# Patient Record
Sex: FEMALE | Race: WHITE | Employment: OTHER | URBAN - METROPOLITAN AREA
[De-identification: names, ages, dates, MRNs, and addresses within clinical notes are randomized per-mention and may not be internally consistent; named-entity substitution may affect disease eponyms.]

---

## 2022-06-13 ENCOUNTER — HOSPITAL ENCOUNTER (OUTPATIENT)
Dept: CT IMAGING | Age: 77
Discharge: HOME | End: 2022-06-13
Payer: MEDICARE

## 2022-06-13 DIAGNOSIS — R29.6 REPEATED FALLS: ICD-10-CM

## 2022-06-13 DIAGNOSIS — R26.9 GAIT DIFFICULTY: ICD-10-CM

## 2022-06-13 PROCEDURE — 70450 CT HEAD/BRAIN W/O DYE: CPT

## 2022-08-03 ENCOUNTER — APPOINTMENT (OUTPATIENT)
Dept: CT IMAGING | Age: 77
End: 2022-08-03
Payer: MEDICARE

## 2022-08-03 ENCOUNTER — HOSPITAL ENCOUNTER (OUTPATIENT)
Age: 77
Setting detail: OBSERVATION
Discharge: NURSING, REHAB OR LONG-TERM CARE | End: 2022-08-05
Attending: STUDENT IN AN ORGANIZED HEALTH CARE EDUCATION/TRAINING PROGRAM | Admitting: INTERNAL MEDICINE
Payer: MEDICARE

## 2022-08-03 DIAGNOSIS — R47.01 APHASIA: Primary | ICD-10-CM

## 2022-08-03 PROBLEM — R47.81 SLURRED SPEECH: Status: ACTIVE | Noted: 2022-08-03

## 2022-08-03 LAB
ANION GAP SERPL CALCULATED.3IONS-SCNC: 10 MMOL/L (ref 3–16)
BASOPHILS ABSOLUTE: 0 K/UL (ref 0–0.2)
BASOPHILS RELATIVE PERCENT: 0.8 %
BUN BLDV-MCNC: 26 MG/DL (ref 7–20)
CALCIUM SERPL-MCNC: 9.2 MG/DL (ref 8.3–10.6)
CHLORIDE BLD-SCNC: 100 MMOL/L (ref 99–110)
CO2: 24 MMOL/L (ref 21–32)
CREAT SERPL-MCNC: 1.1 MG/DL (ref 0.6–1.2)
EOSINOPHILS ABSOLUTE: 0.2 K/UL (ref 0–0.6)
EOSINOPHILS RELATIVE PERCENT: 2.8 %
GFR AFRICAN AMERICAN: 58
GFR NON-AFRICAN AMERICAN: 48
GLUCOSE BLD-MCNC: 96 MG/DL (ref 70–99)
GLUCOSE BLD-MCNC: 98 MG/DL (ref 70–99)
HCT VFR BLD CALC: 40.8 % (ref 36–48)
HEMOGLOBIN: 14.2 G/DL (ref 12–16)
INR BLD: 1.1 (ref 0.87–1.14)
LYMPHOCYTES ABSOLUTE: 0.7 K/UL (ref 1–5.1)
LYMPHOCYTES RELATIVE PERCENT: 12.4 %
MCH RBC QN AUTO: 31.4 PG (ref 26–34)
MCHC RBC AUTO-ENTMCNC: 34.8 G/DL (ref 31–36)
MCV RBC AUTO: 90.3 FL (ref 80–100)
MONOCYTES ABSOLUTE: 0.5 K/UL (ref 0–1.3)
MONOCYTES RELATIVE PERCENT: 8.8 %
NEUTROPHILS ABSOLUTE: 4.5 K/UL (ref 1.7–7.7)
NEUTROPHILS RELATIVE PERCENT: 75.2 %
PDW BLD-RTO: 14 % (ref 12.4–15.4)
PERFORMED ON: NORMAL
PLATELET # BLD: 180 K/UL (ref 135–450)
PMV BLD AUTO: 7.5 FL (ref 5–10.5)
POTASSIUM SERPL-SCNC: 4.8 MMOL/L (ref 3.5–5.1)
PROTHROMBIN TIME: 14.1 SEC (ref 11.7–14.5)
RBC # BLD: 4.51 M/UL (ref 4–5.2)
SODIUM BLD-SCNC: 134 MMOL/L (ref 136–145)
TROPONIN: <0.01 NG/ML
WBC # BLD: 6 K/UL (ref 4–11)

## 2022-08-03 PROCEDURE — 96372 THER/PROPH/DIAG INJ SC/IM: CPT

## 2022-08-03 PROCEDURE — 93005 ELECTROCARDIOGRAM TRACING: CPT | Performed by: STUDENT IN AN ORGANIZED HEALTH CARE EDUCATION/TRAINING PROGRAM

## 2022-08-03 PROCEDURE — 80048 BASIC METABOLIC PNL TOTAL CA: CPT

## 2022-08-03 PROCEDURE — 6370000000 HC RX 637 (ALT 250 FOR IP): Performed by: INTERNAL MEDICINE

## 2022-08-03 PROCEDURE — 70496 CT ANGIOGRAPHY HEAD: CPT

## 2022-08-03 PROCEDURE — G0378 HOSPITAL OBSERVATION PER HR: HCPCS

## 2022-08-03 PROCEDURE — 96374 THER/PROPH/DIAG INJ IV PUSH: CPT

## 2022-08-03 PROCEDURE — 70450 CT HEAD/BRAIN W/O DYE: CPT

## 2022-08-03 PROCEDURE — 85610 PROTHROMBIN TIME: CPT

## 2022-08-03 PROCEDURE — 99285 EMERGENCY DEPT VISIT HI MDM: CPT

## 2022-08-03 PROCEDURE — 85025 COMPLETE CBC W/AUTO DIFF WBC: CPT

## 2022-08-03 PROCEDURE — 6360000002 HC RX W HCPCS: Performed by: INTERNAL MEDICINE

## 2022-08-03 PROCEDURE — 2580000003 HC RX 258: Performed by: INTERNAL MEDICINE

## 2022-08-03 PROCEDURE — 84484 ASSAY OF TROPONIN QUANT: CPT

## 2022-08-03 PROCEDURE — 6360000004 HC RX CONTRAST MEDICATION: Performed by: STUDENT IN AN ORGANIZED HEALTH CARE EDUCATION/TRAINING PROGRAM

## 2022-08-03 RX ORDER — ESCITALOPRAM OXALATE 10 MG/1
10 TABLET ORAL DAILY
COMMUNITY

## 2022-08-03 RX ORDER — ACETAMINOPHEN 325 MG/1
650 TABLET ORAL EVERY 4 HOURS PRN
COMMUNITY

## 2022-08-03 RX ORDER — LEVOTHYROXINE SODIUM 112 UG/1
112 TABLET ORAL DAILY
COMMUNITY

## 2022-08-03 RX ORDER — LEVOTHYROXINE SODIUM 112 UG/1
112 TABLET ORAL DAILY
Status: DISCONTINUED | OUTPATIENT
Start: 2022-08-04 | End: 2022-08-05 | Stop reason: HOSPADM

## 2022-08-03 RX ORDER — ASPIRIN 81 MG/1
81 TABLET ORAL DAILY
Status: DISCONTINUED | OUTPATIENT
Start: 2022-08-03 | End: 2022-08-05 | Stop reason: HOSPADM

## 2022-08-03 RX ORDER — OMEPRAZOLE 20 MG/1
20 CAPSULE, DELAYED RELEASE ORAL DAILY
COMMUNITY

## 2022-08-03 RX ORDER — POLYETHYLENE GLYCOL 3350 17 G/17G
17 POWDER, FOR SOLUTION ORAL DAILY
COMMUNITY

## 2022-08-03 RX ORDER — CARVEDILOL 12.5 MG/1
12.5 TABLET ORAL 2 TIMES DAILY WITH MEALS
Status: DISCONTINUED | OUTPATIENT
Start: 2022-08-03 | End: 2022-08-05 | Stop reason: HOSPADM

## 2022-08-03 RX ORDER — SODIUM CHLORIDE 0.9 % (FLUSH) 0.9 %
10 SYRINGE (ML) INJECTION EVERY 12 HOURS SCHEDULED
Status: DISCONTINUED | OUTPATIENT
Start: 2022-08-03 | End: 2022-08-05 | Stop reason: HOSPADM

## 2022-08-03 RX ORDER — SODIUM CHLORIDE 9 MG/ML
INJECTION, SOLUTION INTRAVENOUS PRN
Status: DISCONTINUED | OUTPATIENT
Start: 2022-08-03 | End: 2022-08-05 | Stop reason: HOSPADM

## 2022-08-03 RX ORDER — ASPIRIN 300 MG/1
300 SUPPOSITORY RECTAL DAILY
Status: DISCONTINUED | OUTPATIENT
Start: 2022-08-03 | End: 2022-08-05 | Stop reason: HOSPADM

## 2022-08-03 RX ORDER — LOSARTAN POTASSIUM 50 MG/1
50 TABLET ORAL 2 TIMES DAILY
Status: ON HOLD | COMMUNITY
End: 2022-08-05 | Stop reason: HOSPADM

## 2022-08-03 RX ORDER — PANTOPRAZOLE SODIUM 40 MG/1
40 TABLET, DELAYED RELEASE ORAL
Status: DISCONTINUED | OUTPATIENT
Start: 2022-08-04 | End: 2022-08-05 | Stop reason: HOSPADM

## 2022-08-03 RX ORDER — HYDRALAZINE HYDROCHLORIDE 25 MG/1
25 TABLET, FILM COATED ORAL 3 TIMES DAILY
COMMUNITY

## 2022-08-03 RX ORDER — HYDRALAZINE HYDROCHLORIDE 25 MG/1
25 TABLET, FILM COATED ORAL 3 TIMES DAILY
Status: DISCONTINUED | OUTPATIENT
Start: 2022-08-03 | End: 2022-08-05 | Stop reason: HOSPADM

## 2022-08-03 RX ORDER — ONDANSETRON 4 MG/1
4 TABLET, ORALLY DISINTEGRATING ORAL EVERY 8 HOURS PRN
Status: DISCONTINUED | OUTPATIENT
Start: 2022-08-03 | End: 2022-08-05 | Stop reason: HOSPADM

## 2022-08-03 RX ORDER — LABETALOL HYDROCHLORIDE 5 MG/ML
10 INJECTION, SOLUTION INTRAVENOUS EVERY 4 HOURS PRN
Status: DISCONTINUED | OUTPATIENT
Start: 2022-08-03 | End: 2022-08-03

## 2022-08-03 RX ORDER — ENOXAPARIN SODIUM 100 MG/ML
40 INJECTION SUBCUTANEOUS NIGHTLY
Status: DISCONTINUED | OUTPATIENT
Start: 2022-08-03 | End: 2022-08-05 | Stop reason: HOSPADM

## 2022-08-03 RX ORDER — POLYETHYLENE GLYCOL 3350 17 G/17G
17 POWDER, FOR SOLUTION ORAL DAILY
Status: DISCONTINUED | OUTPATIENT
Start: 2022-08-04 | End: 2022-08-05 | Stop reason: HOSPADM

## 2022-08-03 RX ORDER — CARVEDILOL 12.5 MG/1
12.5 TABLET ORAL 2 TIMES DAILY WITH MEALS
COMMUNITY

## 2022-08-03 RX ORDER — DOCUSATE SODIUM 100 MG/1
200 CAPSULE, LIQUID FILLED ORAL DAILY
Status: DISCONTINUED | OUTPATIENT
Start: 2022-08-04 | End: 2022-08-05 | Stop reason: HOSPADM

## 2022-08-03 RX ORDER — DOCUSATE SODIUM 100 MG/1
200 CAPSULE, LIQUID FILLED ORAL DAILY
COMMUNITY

## 2022-08-03 RX ORDER — SODIUM CHLORIDE 0.9 % (FLUSH) 0.9 %
10 SYRINGE (ML) INJECTION PRN
Status: DISCONTINUED | OUTPATIENT
Start: 2022-08-03 | End: 2022-08-05 | Stop reason: HOSPADM

## 2022-08-03 RX ORDER — HYDRALAZINE HYDROCHLORIDE 20 MG/ML
10 INJECTION INTRAMUSCULAR; INTRAVENOUS EVERY 6 HOURS PRN
Status: DISCONTINUED | OUTPATIENT
Start: 2022-08-03 | End: 2022-08-05 | Stop reason: HOSPADM

## 2022-08-03 RX ORDER — ESCITALOPRAM OXALATE 10 MG/1
10 TABLET ORAL DAILY
Status: DISCONTINUED | OUTPATIENT
Start: 2022-08-04 | End: 2022-08-05 | Stop reason: HOSPADM

## 2022-08-03 RX ORDER — VITAMIN B COMPLEX
1000 TABLET ORAL DAILY
Status: DISCONTINUED | OUTPATIENT
Start: 2022-08-04 | End: 2022-08-05 | Stop reason: HOSPADM

## 2022-08-03 RX ORDER — ACETAMINOPHEN 500 MG
1000 TABLET ORAL DAILY
COMMUNITY

## 2022-08-03 RX ORDER — LOSARTAN POTASSIUM 50 MG/1
50 TABLET ORAL 2 TIMES DAILY
Status: DISCONTINUED | OUTPATIENT
Start: 2022-08-03 | End: 2022-08-04

## 2022-08-03 RX ORDER — ONDANSETRON 2 MG/ML
4 INJECTION INTRAMUSCULAR; INTRAVENOUS EVERY 6 HOURS PRN
Status: DISCONTINUED | OUTPATIENT
Start: 2022-08-03 | End: 2022-08-05 | Stop reason: HOSPADM

## 2022-08-03 RX ADMIN — CARVEDILOL 12.5 MG: 12.5 TABLET, FILM COATED ORAL at 20:58

## 2022-08-03 RX ADMIN — HYDRALAZINE HYDROCHLORIDE 10 MG: 20 INJECTION INTRAMUSCULAR; INTRAVENOUS at 18:05

## 2022-08-03 RX ADMIN — HYDRALAZINE HYDROCHLORIDE 25 MG: 25 TABLET, FILM COATED ORAL at 20:57

## 2022-08-03 RX ADMIN — SODIUM CHLORIDE, PRESERVATIVE FREE 10 ML: 5 INJECTION INTRAVENOUS at 20:58

## 2022-08-03 RX ADMIN — ASPIRIN 81 MG: 81 TABLET, COATED ORAL at 18:05

## 2022-08-03 RX ADMIN — ENOXAPARIN SODIUM 40 MG: 100 INJECTION SUBCUTANEOUS at 20:56

## 2022-08-03 RX ADMIN — IOPAMIDOL 75 ML: 755 INJECTION, SOLUTION INTRAVENOUS at 14:25

## 2022-08-03 RX ADMIN — CARBIDOPA AND LEVODOPA 1 TABLET: 25; 100 TABLET ORAL at 20:58

## 2022-08-03 RX ADMIN — LOSARTAN POTASSIUM 50 MG: 50 TABLET, FILM COATED ORAL at 20:57

## 2022-08-03 ASSESSMENT — PAIN SCALES - GENERAL
PAINLEVEL_OUTOF10: 0
PAINLEVEL_OUTOF10: 0

## 2022-08-03 ASSESSMENT — LIFESTYLE VARIABLES: HOW OFTEN DO YOU HAVE A DRINK CONTAINING ALCOHOL: NEVER

## 2022-08-03 NOTE — ED NOTES
purewick placed on pt. Readjusted pt. In bed. Tolerated well. No additional needs at this time.       Beronica Garcia RN  08/03/22 3482

## 2022-08-03 NOTE — ED PROVIDER NOTES
Primary Care Physician: No primary care provider on file. Attending Physician: Lovely Cadena MD     History   Chief Complaint   Patient presents with    Aphasia     Pt arrived to dept via [place] ems. Pt c/o aphasia that is resolved and slight slurred speech. Pt is from [facility] and the nurse from the [facility] reports facial droop and slurred speech. Pt awake, alert and oriented x 3. Skin warm and dry/normal color for ethnicity. Resp easy and unlabored. Pt placed in gown and on cardiac monitor. Call light in reach. Will continue to monitor. HPI   Randal Abel  is a 68 y.o. female that history who presents brought by EMS complaining of slurred speech. Patient stated that 1 hour before she presented home aide noticed that her speech was slurred and that she had a facial droop. Patient stated that she was having difficulties finding words. Symptoms have improved by the time patient got to the emergency room. Per EMS her NIH stroke scale was 0. Patient still believe that her speech is a little bit different. Denies any fevers chills nausea vomiting. Past Medical History:   Diagnosis Date    COPD (chronic obstructive pulmonary disease) (HCC)     Hyperlipidemia     Hypertension     Hypothyroid     Neuropathy, peripheral, autonomic, idiopathic     Rhabdomyolysis     S/P insertion of brain-responsive neurostimulation device         History reviewed. No pertinent surgical history. History reviewed. No pertinent family history.      Social History     Socioeconomic History    Marital status:      Spouse name: None    Number of children: None    Years of education: None    Highest education level: None   Tobacco Use    Smoking status: Never    Smokeless tobacco: Never   Vaping Use    Vaping Use: Never used   Substance and Sexual Activity    Alcohol use: Never    Drug use: Never        Review of Systems   10 total systems reviewed and found to be negative unless otherwise noted in HPI     Physical Exam   BP (!) 151/79   Pulse 68   Temp 97.6 °F (36.4 °C) (Oral)   Resp 20   Ht 5' 5\" (1.651 m)   Wt 213 lb 10 oz (96.9 kg)   SpO2 98%   BMI 35.55 kg/m²      CONSTITUTIONAL: Well appearing, in no acute distress   HEAD: atraumatic, normocephalic   EYES: PERRL, No injection, discharge or scleral icterus. ENT: Moist mucous membranes. NECK: Normal ROM, NO LAD   CARDIOVASCULAR: Regular rate and rhythm. No murmurs or gallop. PULMONARY/CHEST: Airway patent. No retractions. Breath sounds clear with good air entry bilaterally. ABDOMEN: Soft, Non-distended and non-tender, without guarding or rebound. SKIN: Acyanotic, warm, dry   MUSCULOSKELETAL: No swelling, tenderness or deformity   NEUROLOGICAL: Awake and oriented x 3. Pulses intact. Grossly nonfocal   Nursing note and vitals reviewed. NIH Stroke Scale     Time: 3:00 PM   Person Administering Scale: Dharaiikye Mendez MD     Level of consciousness: [0]   0 = Alert;   1 = Not alert;   2 = Not alert;   3 = Responds only with reflex motor or autonomic effects or totally unresponsive, flaccid, and flexic. LOC questions: [0]   0 = Answers both questions correctly. 1 = Answers one question correctly. 2 = Answers neither question correctly. LOC commands: [0]   0 = Performs both tasks correctly. 1 = Performs one task correctly. 2 = Performs neither task correctly. Best Gaze: [ 0 ]   0 = Normal   1 = Partial gaze palsy   2 = Forced deviation     Visual: [0]   0 = No visual loss   1 = Partial hemianopia   2 = Complete hemianopia   3 = Bilateral hemianopia     Facial Palsy: [0]   0 = Normal   1 = Minor paralysis   2 = Partial paralysis   3 = Complete paralysis     Motor left arm: [0]   0 = No drift;   1 = Drift   2 = Some effort against gravity;   3 = No effort against gravity;   4 = No movement.    UN = Amputation Motor right arm: [0]   0 = No drift   1 = Drift   2 = Some effort against gravity   3 = No effort against gravity   4 = No movement   UN = Amputation     Motor left leg: [0]   0 = No drift   1.= Drift   2 = Some effort against gravity   3 = No effort against gravity   4 = No movement. UN = Amputation     Motor right leg: [0]   0 = No drift   1 = Drift   2 = Some effort against gravity   3 = No effort against gravity   4 = No movement   UN = Amputation     Limb Ataxia: [0]   0 = Absent. 1 = Present in one limb. 2 = Present in two limbs   UN = Amputation     Sensory: [0]   0 = Absent   1.= Present in one limb   2 = Present in two limbs   UN = Amputation     Best Language: [0]   0 = No aphasia   1 = Mild-to-moderate aphasia   2 = Severe aphasia   3 = Mute, global aphasia     Dysarthria: [0]   1 = Mild-to-moderate dysarthria   2 = Severe dysarthria   UN = Intubated     Extinction and Inattention: [0]   0 = No abnormality. 1 = Visual, tactile, auditory, spatial, or personal inattention   2 = Profound billie-inattention or extinction to more than one modality     TOTAL: Juan.Cosier ]       ED Course & Medical Decision Making   Medications   iopamidol (ISOVUE-370) 76 % injection 75 mL (75 mLs IntraVENous Given 8/3/22 1425)      Labs Reviewed   CBC WITH AUTO DIFFERENTIAL - Abnormal; Notable for the following components:       Result Value    Lymphocytes Absolute 0.7 (*)     All other components within normal limits   PROTIME-INR   BASIC METABOLIC PANEL   TROPONIN   POCT GLUCOSE   POCT GLUCOSE      CT HEAD WO CONTRAST   Final Result   No acute intracranial abnormality. Stable generalized cerebral atrophy and chronic small vessel white matter   ischemic change. Findings were discussed with MARIELLE TYSON at 2:42 p.m. on 8/3/2022. CTA HEAD NECK W CONTRAST    (Results Pending)      No results found.      EKG INTERPRETATION:  EKG by my preliminary interpretation shows sinus rhythm with rate of 59, normal axis, normal intervals, with no ST changes indicative of ischemia at this time. PROCEDURES:   Procedures    ASSESSMENT AND PLAN:  [id number], Ashli Deal is a 68 y.o. female that history who presents brought by EMS complaining of slurred speech. Patient stated that 1 hour before she presented home aide noticed that her speech was slurred and that she had a facial droop. Patient stated that she was having difficulties finding words. Symptoms have improved by the time patient got to the emergency room. Per EMS her NIH stroke scale was 0. Patient still believe that her speech is a little bit different. Appears nontoxic in no acute distress. NIH stroke scale of 0. Nonetheless stroke protocol activated. CT of the head CTA obtained shows no abnormal findings. Stroke team recommended no intervention. Despite normal work-up and evaluation we recommending the patient be admitted for TIA evaluation. Hospitalist consulted pending admission. ClINICAL IMPRESSION:  1. Aphasia        DISPOSITION Decision To Admit 08/03/2022 02:56:55 PM    CRITICAL CARE NOTE:  There was a high probability of clinically significant life-threatening deterioration of the patient's condition requiring my urgent intervention. This includes multiple reevaluations, vital sign monitoring, pulse oximetry monitoring, telemetry monitoring, clinical response to the IV medications, reviewing the nursing notes, consultation time, dictation/documentation time, and interpretation of the labwork. (This time excludes time spent performing procedures). I personally saw the patient and independently provided 35 minutes of non-concurrent critical care out of the total shared critical care time provided.     ___________________________________________________________________________________  _________________________________________________________________________________________  This record is transcribed utilizing voice recognition technology. There are inherent limitations in this technology. In addition, there may be limitations in editing of this report. If there are any discrepancies, please contact me directly.         Henry Hull MD  08/03/22 1500

## 2022-08-03 NOTE — H&P
Hospital Medicine History & Physical      PCP: Qian Grijalva    Date of Admission: 8/3/2022    Chief Complaint: Difficulty finding words    History Of Present Illness:    Patient is a 55-year-old female who presented to hospital for difficulty finding words. Patient mentions she has chronic bilateral lower extremity numbness otherwise denies any new numbness tingling sensation, weakness lightheadedness. Patient also denied chest pain shortness of breath nausea vomiting diarrhea constipation dysuria fevers and chills. Patient was further evaluated and admitted for stroke work-up. Past Medical History:          Diagnosis Date    COPD (chronic obstructive pulmonary disease) ([facility] [address]     Hyperlipidemia     Hypertension     Hypothyroid     Neuropathy, peripheral, autonomic, idiopathic     Rhabdomyolysis     S/P insertion of brain-responsive neurostimulation device        Past Surgical History:      History reviewed. No pertinent surgical history. Medications Prior to Admission:      Prior to Admission medications    Medication Sig Start Date End Date Taking? Authorizing Provider   acetaminophen (TYLENOL) 500 MG tablet Take 500 mg by mouth in the morning. Yes Historical Provider, MD   acetaminophen (TYLENOL) 325 MG tablet Take 650 mg by mouth every 4 hours as needed for Pain   Yes Historical Provider, MD   carbidopa-levodopa (SINEMET)  MG per tablet Take 1 tablet by mouth in the morning and 1 tablet at noon and 1 tablet before bedtime. Yes Historical Provider, MD   vitamin D 25 MCG (1000 UT) CAPS Take 1 capsule by mouth in the morning. Yes Historical Provider, MD   docusate sodium (COLACE) 100 MG capsule Take 200 mg by mouth in the morning. Yes Historical Provider, MD   carvedilol (COREG) 12.5 MG tablet Take 12.5 mg by mouth in the morning and 12.5 mg in the evening. Take with meals. Yes Historical Provider, MD   escitalopram (LEXAPRO) 10 MG tablet Take 10 mg by mouth in the morning.    Yes Historical Provider, MD   hydrALAZINE (APRESOLINE) 25 MG tablet Take 25 mg by mouth in the morning and 25 mg at noon and 25 mg before bedtime. Yes Historical Provider, MD   levothyroxine (SYNTHROID) 112 MCG tablet Take 112 mcg by mouth in the morning. Yes Historical Provider, MD   losartan (COZAAR) 50 MG tablet Take 50 mg by mouth in the morning and at bedtime   Yes Historical Provider, MD   polyethylene glycol (GLYCOLAX) 17 g packet Take 17 g by mouth in the morning. Yes Historical Provider, MD   omeprazole (PRILOSEC) 20 MG delayed release capsule Take 20 mg by mouth in the morning. Yes Historical Provider, MD   tiotropium (SPIRIVA) 18 MCG inhalation capsule Inhale 18 mcg into the lungs in the morning. Yes Historical Provider, MD       Allergies:  Amlodipine besylate, Erythromycin, Isoniazid, Meperidine hcl, Nickel, Statins, Sulfa antibiotics, Amlodipine, Rosuvastatin, and Sulfites    Social History:      TOBACCO:   reports that she has never smoked. She has never used smokeless tobacco.  ETOH:   reports no history of alcohol use. Family History:       Reviewed in detail and non contributory      History reviewed. No pertinent family history. REVIEW OF SYSTEMS:   Pertinent positives as noted in the HPI. All other systems reviewed and negative. PHYSICAL EXAM PERFORMED:    /71   Pulse 62   Temp 97.6 °F (36.4 °C) (Oral)   Resp 12   Ht 5' 5\" (1.651 m)   Wt 213 lb 6.5 oz (96.8 kg)   SpO2 98%   BMI 35.51 kg/m²     General appearance:  No apparent distress, cooperative. HEENT:  Normal cephalic, atraumatic without obvious deformity. Conjunctivae/corneas clear. Neck: Supple, with full range of motion. No cervical lymphadenopathy  Respiratory:  Normal respiratory effort. Clear to auscultation, bilaterally without Rales/Wheezes/Rhonchi. Cardiovascular:  Regular rate and rhythm with normal S1/S2 without murmurs, rubs or gallops.   Abdomen: Soft, non-tender, non-distended, normal bowel sounds. Musculoskeletal:  No edema noted bilaterally. No tenderness on palpation   Skin: no rash visible  Neurologic:  Neurologically intact without any focal sensory/motor deficits. grossly non-focal.  Psychiatric:  Alert and oriented, normal mood  Peripheral Pulses: +2 palpable, equal bilaterally       Labs:     Recent Labs     08/03/22  1416   WBC 6.0   HGB 14.2   HCT 40.8        Recent Labs     08/03/22  1416   *   K 4.8      CO2 24   BUN 26*   CREATININE 1.1   CALCIUM 9.2     No results for input(s): AST, ALT, BILIDIR, BILITOT, ALKPHOS in the last 72 hours. Recent Labs     08/03/22  1416   INR 1.10     Recent Labs     08/03/22  1416   TROPONINI <0.01       Urinalysis:    No results found for: Aletta Laron, BACTERIA, RBCUA, BLOODU, SPECGRAV, GLUCOSEU    Radiology:       CT HEAD WO CONTRAST   Final Result   No acute intracranial abnormality. Stable generalized cerebral atrophy and chronic small vessel white matter   ischemic change. Findings were discussed with MARIELLE TYSON at 2:42 p.m. on 8/3/2022. CTA HEAD NECK W CONTRAST   Final Result   Mild to moderate stenosis in the P2 and P3 segments of the left PCA. No acute abnormality or flow-limiting stenosis in the remainder of the major   arteries of the head and neck. MRI brain without contrast    (Results Pending)           Active Hospital Problems    Diagnosis Date Noted    Slurred speech [R47.81] 08/03/2022     Priority: Medium       Patient is a 80-year-old female who presented to hospital for difficulty finding words. Patient mentions she has chronic bilateral lower extremity numbness otherwise denies any new numbness tingling sensation, weakness lightheadedness. Patient also denied chest pain shortness of breath nausea vomiting diarrhea constipation dysuria fevers and chills. Patient was further evaluated and admitted for stroke work-up.     Assessment  Aphasia, rule out CVA  COPD  Hypertension  Hyperlipidemia  Chronic bilateral lower extremity neuropathy    Plan  MRI brain without contrast  Aspirin, patient mentions she is allergic to statin  Consult neurology, PT/OT/speech therapy  Stroke protocol  Resume home medications  DVT prophylaxis-Lovenox  Diet: ADULT DIET; Regular  Code Status: Full Code    PT/OT Eval Status: ordered    Dispo - pending clinical improvement       David Romero MD    The note was completed using EMR and Dragon dictation system. Every effort was made to ensure accuracy; however, inadvertent computerized transcription errors may be present. Thank you Estrella Martinez for the opportunity to be involved in this patient's care. If you have any questions or concerns please feel free to contact me at [phone].     David Romero MD

## 2022-08-03 NOTE — PLAN OF CARE
Problem: Discharge Planning  Goal: Discharge to home or other facility with appropriate resources  Outcome: Progressing  Flowsheets (Taken 8/3/2022 1726)  Discharge to home or other facility with appropriate resources: Identify barriers to discharge with patient and caregiver     Problem: Safety - Adult  Goal: Free from fall injury  Outcome: Progressing     Problem: Skin/Tissue Integrity  Goal: Absence of new skin breakdown  Description: 1. Monitor for areas of redness and/or skin breakdown  2. Assess vascular access sites hourly  3. Every 4-6 hours minimum:  Change oxygen saturation probe site  4. Every 4-6 hours:  If on nasal continuous positive airway pressure, respiratory therapy assess nares and determine need for appliance change or resting period.   Outcome: Progressing

## 2022-08-03 NOTE — PROGRESS NOTES
Speech Language Pathology      Patient name: Marilyn Blandon  Patient : 1945    Admit Diagnosis: The encounter diagnosis was Aphasia. Referral received for swallow screen per Dr. Tyrell Lopez MD referral.  This SLP performed a chart review and discussed with RN. Chart review:   Current diet: npo  CT or X-ray Chest: No chest XR noted in chart review  CT Head: 8/3/2022  Impression   No acute intracranial abnormality. Stable generalized cerebral atrophy and chronic small vessel white matter   ischemic change. Findings were discussed with MARIELLE TYSON at 2:42 p.m. on 8/3/2022. CTA Head Neck 8/3/2022  Impression   Mild to moderate stenosis in the P2 and P3 segments of the left PCA. No acute abnormality or flow-limiting stenosis in the remainder of the major   arteries of the head and neck. has a past medical history of COPD (chronic obstructive pulmonary disease) ([facility] [address] Hyperlipidemia, Hypertension, Hypothyroid, Neuropathy, peripheral, autonomic, idiopathic, Rhabdomyolysis, and S/P insertion of brain-responsive neurostimulation device. Recommend  1. No requests for Clinical Evaluation of Swallowing at this time. If status should change, please request MD orders for Clinical Evaluation of Swallowing  Discussed with ASHLEY Sheffield). RN reporting she has not completed the nursing screen yet. RN reports s/s have resolved since admit to Ed. RN reports plans to complete nursing swallow screen ; if any problems will request MD order for Evaluation. Thank you     signed  Shahid Stubbs,MS,CCC,SLP [id number]  Speech and Language Pathologist  8/3/2022

## 2022-08-03 NOTE — PROGRESS NOTES
Patient's BP on admission was 205/95. Perfect serve sent to MD. PRN 10mg of Hydralazine given.       Electronically signed by Crissy Cevallos RN on 8/3/2022 at 6:15 PM

## 2022-08-04 ENCOUNTER — APPOINTMENT (OUTPATIENT)
Dept: MRI IMAGING | Age: 77
End: 2022-08-04
Payer: MEDICARE

## 2022-08-04 LAB
ANION GAP SERPL CALCULATED.3IONS-SCNC: 12 MMOL/L (ref 3–16)
BUN BLDV-MCNC: 26 MG/DL (ref 7–20)
CALCIUM SERPL-MCNC: 9.2 MG/DL (ref 8.3–10.6)
CHLORIDE BLD-SCNC: 103 MMOL/L (ref 99–110)
CHOLESTEROL, TOTAL: 223 MG/DL (ref 0–199)
CO2: 20 MMOL/L (ref 21–32)
CREAT SERPL-MCNC: 1 MG/DL (ref 0.6–1.2)
EKG ATRIAL RATE: 59 BPM
EKG DIAGNOSIS: NORMAL
EKG P AXIS: 52 DEGREES
EKG P-R INTERVAL: 162 MS
EKG Q-T INTERVAL: 428 MS
EKG QRS DURATION: 76 MS
EKG QTC CALCULATION (BAZETT): 423 MS
EKG R AXIS: 42 DEGREES
EKG T AXIS: 53 DEGREES
EKG VENTRICULAR RATE: 59 BPM
ESTIMATED AVERAGE GLUCOSE: 116.9 MG/DL
GFR AFRICAN AMERICAN: >60
GFR NON-AFRICAN AMERICAN: 54
GLUCOSE BLD-MCNC: 167 MG/DL (ref 70–99)
HBA1C MFR BLD: 5.7 %
HCT VFR BLD CALC: 42.7 % (ref 36–48)
HDLC SERPL-MCNC: 55 MG/DL (ref 40–60)
HEMOGLOBIN: 14.6 G/DL (ref 12–16)
LDL CHOLESTEROL CALCULATED: 144 MG/DL
LV EF: 58 %
LVEF MODALITY: NORMAL
MCH RBC QN AUTO: 31.3 PG (ref 26–34)
MCHC RBC AUTO-ENTMCNC: 34.3 G/DL (ref 31–36)
MCV RBC AUTO: 91.1 FL (ref 80–100)
PDW BLD-RTO: 14 % (ref 12.4–15.4)
PLATELET # BLD: 178 K/UL (ref 135–450)
PMV BLD AUTO: 7.4 FL (ref 5–10.5)
POTASSIUM REFLEX MAGNESIUM: 4.6 MMOL/L (ref 3.5–5.1)
RBC # BLD: 4.68 M/UL (ref 4–5.2)
SODIUM BLD-SCNC: 135 MMOL/L (ref 136–145)
TRIGL SERPL-MCNC: 122 MG/DL (ref 0–150)
VLDLC SERPL CALC-MCNC: 24 MG/DL
WBC # BLD: 5.3 K/UL (ref 4–11)

## 2022-08-04 PROCEDURE — 6370000000 HC RX 637 (ALT 250 FOR IP): Performed by: INTERNAL MEDICINE

## 2022-08-04 PROCEDURE — 97161 PT EVAL LOW COMPLEX 20 MIN: CPT

## 2022-08-04 PROCEDURE — 94640 AIRWAY INHALATION TREATMENT: CPT

## 2022-08-04 PROCEDURE — 97165 OT EVAL LOW COMPLEX 30 MIN: CPT

## 2022-08-04 PROCEDURE — 97530 THERAPEUTIC ACTIVITIES: CPT

## 2022-08-04 PROCEDURE — 6360000004 HC RX CONTRAST MEDICATION: Performed by: STUDENT IN AN ORGANIZED HEALTH CARE EDUCATION/TRAINING PROGRAM

## 2022-08-04 PROCEDURE — 93010 ELECTROCARDIOGRAM REPORT: CPT | Performed by: INTERNAL MEDICINE

## 2022-08-04 PROCEDURE — 83036 HEMOGLOBIN GLYCOSYLATED A1C: CPT

## 2022-08-04 PROCEDURE — 94760 N-INVAS EAR/PLS OXIMETRY 1: CPT

## 2022-08-04 PROCEDURE — 96372 THER/PROPH/DIAG INJ SC/IM: CPT

## 2022-08-04 PROCEDURE — G0378 HOSPITAL OBSERVATION PER HR: HCPCS

## 2022-08-04 PROCEDURE — 92610 EVALUATE SWALLOWING FUNCTION: CPT

## 2022-08-04 PROCEDURE — C8929 TTE W OR WO FOL WCON,DOPPLER: HCPCS

## 2022-08-04 PROCEDURE — 2580000003 HC RX 258: Performed by: INTERNAL MEDICINE

## 2022-08-04 PROCEDURE — 36415 COLL VENOUS BLD VENIPUNCTURE: CPT

## 2022-08-04 PROCEDURE — 6370000000 HC RX 637 (ALT 250 FOR IP): Performed by: PSYCHIATRY & NEUROLOGY

## 2022-08-04 PROCEDURE — 85027 COMPLETE CBC AUTOMATED: CPT

## 2022-08-04 PROCEDURE — 6360000002 HC RX W HCPCS: Performed by: INTERNAL MEDICINE

## 2022-08-04 PROCEDURE — 80048 BASIC METABOLIC PNL TOTAL CA: CPT

## 2022-08-04 PROCEDURE — 70551 MRI BRAIN STEM W/O DYE: CPT

## 2022-08-04 PROCEDURE — 80061 LIPID PANEL: CPT

## 2022-08-04 RX ORDER — CLOPIDOGREL BISULFATE 75 MG/1
75 TABLET ORAL DAILY
Status: DISCONTINUED | OUTPATIENT
Start: 2022-08-05 | End: 2022-08-05 | Stop reason: HOSPADM

## 2022-08-04 RX ORDER — CLOPIDOGREL BISULFATE 75 MG/1
300 TABLET ORAL ONCE
Status: COMPLETED | OUTPATIENT
Start: 2022-08-04 | End: 2022-08-04

## 2022-08-04 RX ORDER — LANOLIN ALCOHOL/MO/W.PET/CERES
CREAM (GRAM) TOPICAL 2 TIMES DAILY
COMMUNITY

## 2022-08-04 RX ADMIN — LOSARTAN POTASSIUM 75 MG: 50 TABLET, FILM COATED ORAL at 20:46

## 2022-08-04 RX ADMIN — CLOPIDOGREL BISULFATE 300 MG: 75 TABLET ORAL at 17:53

## 2022-08-04 RX ADMIN — CARVEDILOL 12.5 MG: 12.5 TABLET, FILM COATED ORAL at 12:44

## 2022-08-04 RX ADMIN — CARBIDOPA AND LEVODOPA 1 TABLET: 25; 100 TABLET ORAL at 20:46

## 2022-08-04 RX ADMIN — ESCITALOPRAM OXALATE 10 MG: 10 TABLET ORAL at 12:46

## 2022-08-04 RX ADMIN — HYDRALAZINE HYDROCHLORIDE 25 MG: 25 TABLET, FILM COATED ORAL at 12:46

## 2022-08-04 RX ADMIN — CARBIDOPA AND LEVODOPA 1 TABLET: 25; 100 TABLET ORAL at 12:44

## 2022-08-04 RX ADMIN — Medication 1000 UNITS: at 12:47

## 2022-08-04 RX ADMIN — TIOTROPIUM BROMIDE 18 MCG: 18 CAPSULE ORAL; RESPIRATORY (INHALATION) at 09:26

## 2022-08-04 RX ADMIN — LOSARTAN POTASSIUM 50 MG: 50 TABLET, FILM COATED ORAL at 12:47

## 2022-08-04 RX ADMIN — ENOXAPARIN SODIUM 40 MG: 100 INJECTION SUBCUTANEOUS at 20:45

## 2022-08-04 RX ADMIN — ASPIRIN 81 MG: 81 TABLET, COATED ORAL at 12:44

## 2022-08-04 RX ADMIN — SODIUM CHLORIDE, PRESERVATIVE FREE 10 ML: 5 INJECTION INTRAVENOUS at 20:47

## 2022-08-04 RX ADMIN — LEVOTHYROXINE SODIUM 112 MCG: 0.11 TABLET ORAL at 05:33

## 2022-08-04 RX ADMIN — PANTOPRAZOLE SODIUM 40 MG: 40 TABLET, DELAYED RELEASE ORAL at 05:33

## 2022-08-04 RX ADMIN — PANTOPRAZOLE SODIUM 40 MG: 40 TABLET, DELAYED RELEASE ORAL at 17:53

## 2022-08-04 RX ADMIN — SODIUM CHLORIDE, PRESERVATIVE FREE 10 ML: 5 INJECTION INTRAVENOUS at 12:48

## 2022-08-04 RX ADMIN — HYDRALAZINE HYDROCHLORIDE 25 MG: 25 TABLET, FILM COATED ORAL at 20:46

## 2022-08-04 RX ADMIN — CARVEDILOL 12.5 MG: 12.5 TABLET, FILM COATED ORAL at 17:53

## 2022-08-04 RX ADMIN — DOCUSATE SODIUM 200 MG: 100 CAPSULE, LIQUID FILLED ORAL at 12:44

## 2022-08-04 ASSESSMENT — PAIN SCALES - GENERAL
PAINLEVEL_OUTOF10: 0
PAINLEVEL_OUTOF10: 0

## 2022-08-04 NOTE — PROGRESS NOTES
Facility/Department: [address] PROGRESSIVE CARE  Initial Assessment  DYSPHAGIA BEDSIDE SWALLOW EVALUATION     Patient: Dev Julien   : [birth year]   MRN: [id number]      Evaluation Date: 2022   Admitting Diagnosis: Aphasia [R47.01]  Slurred speech [R47.81]  Pain: Denies                                                         Chart Review:   H&P: Patient is a 49-year-old female who presented to hospital for difficulty finding words. Patient mentions she has chronic bilateral lower extremity numbness otherwise denies any new numbness tingling sensation, weakness lightheadedness. Patient also denied chest pain shortness of breath nausea vomiting diarrhea constipation dysuria fevers and chills. Patient was further evaluated and admitted for stroke work-up. Current Diet Level (prior to evaluation): Regular texture diet  Thin liquids    Respiratory Status:   [x]Room Air   []O2 via nasal cannula   []Other:    Imaging:  CT HEAD 8/3/22  Impression   No acute intracranial abnormality. Stable generalized cerebral atrophy and chronic small vessel white matter   ischemic change. MRI Brain ordered 8/3/22    Modified Barium Swallow Study: any recent history in chart review: None in EMR    Reason for referral: SLP evaluation orders received due to CVA protocol     History/Prior Level of Function:   Living Status: Lives in assisted living  Baseline diet: Regular/thin liquids  Prior Dysphagia History: None in chart, patient denies history     Dysphagia Impressions/Diagnosis: Oropharyngeal Dysphagia   OROPHARYNGEAL DYSPHAGIA DIAGNOSIS: WFL  Accepted and tolerated evaluation at bedside  Patient oriented x4, able to follow complex dx, and verbally responsive. Patient reports resolved aphasia. Denies speech/lang/cog deficits, no speech lang eval warranted at this time  Swallow function appears [facility].  Patient tolerating regular textures/thin liquids with adequate mastication, clearance of bolus, timely swallow initiation, decreased laryngeal elevation, no overt s/s of aspiration or penetration. Denies swallowing difficulty  ST to follow up 1-2x pending MRI results      Recommended Diet and Intervention 8/4/2022:  Diet Solids Recommendation:  Regular texture diet  Liquid Consistency Recommendation: Thin liquids  Recommended form of Meds: Whole with water     Compensatory Swallowing Strategies:  Upright as possible with all PO intake , Remain upright 30-45 min     SHORT TERM DYSPHAGIA GOALS/PLAN OF CARE: Speech therapy for dysphagia tx 1-2 times to ensure diet tolerance. Pending MRI results  Pt will functionally tolerate recommended diet with no overt clinical s/s of aspiration   Pt will demonstrate understanding of aspiration risk and precautions via education/demonstration with occasional prompting     Dysphagia Therapeutic Intervention:  Diet Tolerance Monitoring , Patient/Family Education     Dysphagia Prognosis: [x] good []fair  []guarded []poor      Discharge Recommendations: Further speech/dysphagia treatment follow-up is pending MRI results with further treatment as indicated       TEST DATA  Vision: Wears glasses  Hearing: [facility]    Consistencies Presented:    Thin liquid;   Puree food  Regular solid food    Cognitive/behavioral:   []orientation [x] to self [x] place [x] date [x] reason for admit [x] purpose of evaluation  []distractible  []verbally responsive  []follows one step commands  []agitated  []impulsive  [] other:       Patient Positioning: Upright in bed     Dentition:  [x]Adequate  []Dentures   []Missing Many Teeth  []Edentulous  []Other:    Baseline Vocal Quality:  [x]Normal  []Dysphonic   []Aphonic   []Hoarse  []Wet  []Weak  []Other:    Volitional Cough:  [x]Strong  []Weak  []Wet  []Absent  []Congested  []Other:    Volitional Swallow:   []Absent   []Delayed     [x]Adequate     []Required use of drink     Oral Mechanism Exam:  [x]WFL []Mild   [] Moderate  []Severe  []To be assessed  Impaired:   []Left side      []Right side    []Labial ROM/Coordination    []Labial Symmetry   []Lingual ROM/Coordination   []Lingual Symmetry  []Gag  []Other:     Oral Phase: [x]WFL []Mild   [] Moderate  []Severe  []To be assessed   []Impaired/Prolonged Mastication:   []Spillage Left:   []Spillage Right:  []Pocketing Left:   []Pocketing Right:   []Decreased Anterior to Posterior Transit:   []Suspected Premature Bolus Loss:   []Lingual/Palatal Residue:   []Other:     Pharyngeal Phase: [x]WFL []Mild   [] Moderate  []Severe  []To be assessed   []Delayed Swallow:   []Suspected Pharyngeal Pooling:   []Decreased Laryngeal Elevation:   []Absent Swallow:  []Wet Vocal Quality:   []Throat Clearing-Immediate:   []Throat Clearing-Delayed:   []Cough-Immediate:   []Cough-Delayed:  []Change in Vital Signs:  []Suspected Delayed Pharyngeal Clearing:  []Other:       Eating Assistance:  [x]Independent  []Setup or clean-up assistance   [] Supervision or touching assistance   [] Partial or moderate assistance   [] Substantial or maximal assistance  [] Dependent       EDUCATION:   Provided education regarding role of SLP, results of assessment, recommendations and general speech pathology plan of care. [x] Pt verbalized understanding and agreement   [] Pt requires ongoing learning   [] No evidence of comprehension     If patient discharges prior to next visit, this note will serve as discharge. Timed Code Minutes:    Total Treatment Minutes:     Electronically signed by:    Renate Akhtar, [address] [place] [address]  Speech-Language Pathologist  On 08/04/22 at 8:43 AM

## 2022-08-04 NOTE — PROGRESS NOTES
Physical Therapy  Facility/Department: [facility] 5N PROGRESSIVE CARE  Physical Therapy Initial Assessment    Name: Jenelle Montgomery  :   MRN: [id number]  Date of Service: 2022    Discharge Recommendations:  Patient would benefit from continued therapy after discharge   PT Equipment Recommendations  Equipment Needed: No      Patient Diagnosis(es): The encounter diagnosis was Aphasia. Past Medical History:  has a past medical history of COPD (chronic obstructive pulmonary disease) ([address] Hyperlipidemia, Hypertension, Hypothyroid, Neuropathy, peripheral, autonomic, idiopathic, Rhabdomyolysis, and S/P insertion of brain-responsive neurostimulation device. Past Surgical History:  has no past surgical history on file. Assessment   Body Structures, Functions, Activity Limitations Requiring Skilled Therapeutic Intervention: Decreased functional mobility ; Decreased balance;Decreased posture;Decreased endurance  Assessment: Pt is a 68 y.o. F. admitted 8/3 from [address] [place] [address] Dr WILLIAMSON for slurred speech, CVA R/O. She presents A&O x 4, no slurring of speech noted. LE strength is WFL, but pt's sitting/standing balance is poor d/t posterior lean, requiring Mod A to complete bed mobility and transfers, and Min A for short-distance ambulation. She would benefit from low-moderate frequency therapy upon D/C to improve balance, safety awareness, and independence with transfers/ambulation. Jenelle Montgomery scored a 14/24 on the AM-PAC short mobility form. Current research shows that an AM-PAC score of 17 or less is typically not associated with a discharge to the patient's home setting. Based on the patient's AM-PAC score and their current functional mobility deficits, it is recommended that the patient have 3-5 sessions per week of Physical Therapy at d/c to increase the patient's independence. Please see assessment section for further patient specific details.     If patient discharges prior to next session this note will serve as a discharge summary. Please see below for the latest assessment towards goals. Therapy Prognosis: Good  Decision Making: Low Complexity  History: See below  Exam: Strength; ROM; Balance; Ambulation  Clinical Presentation: Stable  Activity Tolerance  Activity Tolerance: Patient tolerated evaluation without incident     Plan   Plan  Plan: 3-5 times per week  Current Treatment Recommendations: Strengthening, Balance training, Functional mobility training, Transfer training, Endurance training, Stair training, Gait training, Neuromuscular re-education, Safety education & training, Equipment evaluation, education, & procurement, Positioning, Therapeutic activities  Safety Devices  Type of Devices: All fall risk precautions in place, Call light within reach, Chair alarm in place, Gait belt, Left in chair, Nurse notified  Restraints  Restraints Initially in Place: No     Restrictions  Restrictions/Precautions  Restrictions/Precautions: Fall Risk  Position Activity Restriction  Other position/activity restrictions: CVA R/O     Subjective   General  Chart Reviewed: Yes  Patient assessed for rehabilitation services?: Yes  Additional Pertinent Hx: Patient is a 63-year-old female who presented to hospital for difficulty finding words. Patient mentions she has chronic bilateral lower extremity numbness otherwise denies any new numbness tingling sensation, weakness lightheadedness. Patient also denied chest pain shortness of breath nausea vomiting diarrhea constipation dysuria fevers and chills. Patient was further evaluated and admitted for stroke work-up. Response To Previous Treatment: Not applicable  Referring Practitioner: Dr. Cole Figueroa  Referral Date : 08/03/22  Diagnosis: Slurred Speech  Follows Commands: Within Functional Limits  Subjective  Subjective: Pt pleasant and agreeable to evaluation.          Social/Functional History  Social/Functional History  Lives With: Alone  Type of Home: Assisted living [facility])  Home Layout: One level  Home Access: Level entry  Bathroom Shower/Tub: Walk-in shower  Bathroom Toilet: Handicap height  Bathroom Equipment: Grab bars in shower  Home Equipment: Walker, rolling, Wheelchair-electric  ADL Assistance:  (Help as needed for bathing/dressing. (I) toileting.)  Ambulation Assistance: Independent (Uses RW in room (I), wc for longer distances)  Transfer Assistance: Independent  Additional Comments: No hx of falls    Vision/Hearing  Vision  Vision: Impaired  Vision Exceptions: Wears glasses at all times  Hearing  Hearing: Within functional limits      Cognition   Orientation  Overall Orientation Status: Within Normal Limits     Objective     Observation/Palpation  Observation: Significant posterior lean sitting EOB, and when attempting to stand, requiring physical assist to correct. AROM RLE (degrees)  RLE AROM: WFL  AROM LLE (degrees)  LLE AROM : WFL  Strength RLE  Strength RLE: WFL  Strength LLE  Strength LLE: WFL     Bed Mobility Training  Bed Mobility Training: Yes  Overall Level of Assistance: Moderate assistance  Supine to Sit: Moderate assistance (HOB elevated)    Balance  Sitting: Impaired  Sitting - Static: Poor (constant support)  Sitting - Dynamic: Poor (constant support)  Standing: Impaired (RW)  Standing - Static: Fair  Standing - Dynamic: Poor    Transfers  Sit to Stand: Moderate Assistance  Stand to sit: Moderate Assistance  Comment: Able to stand from commode with Mod A, and use of grab bar. Ambulation  Surface: level tile  Device: Rolling Walker  Assistance: Minimal assistance  Quality of Gait: Slow speed, kyphotic posture, intermittent Min A for walker management and trunk control.   Distance: 25' x 2  Stairs/Curb  Stairs?: No     Balance  Posture: Fair  Sitting - Static: Poor  Sitting - Dynamic: Poor  Standing - Static: Poor  Standing - Dynamic: Poor      AM-PAC Score  AM-PAC Inpatient Mobility Raw Score : 14 (08/04/22 9724)  AM-PAC Inpatient T-Scale Score : 38.1 (08/04/22 0930)  Mobility Inpatient CMS 0-100% Score: 61.29 (08/04/22 0930)  Mobility Inpatient CMS G-Code Modifier : CL (08/04/22 0930)    Goals  Short Term Goals  Time Frame for Short term goals: In 2-3 days  Short term goal 1: Bed mobility Min A  Short term goal 2: Transfers Min A  Short term goal 3: Ambulation 48' with walker, CGA  Patient Goals   Patient goals :  To return home today       Therapy Time   Individual Concurrent Group Co-treatment   Time In       0852   Time Out       0930   Minutes       38   Timed Code Treatment Minutes: 23 Minutes   Low Complexity Evaluation    Vicente Kohli PT   Electronically signed by Vicente Kohli, DIANA [id number] on 8/4/2022 at 9:33 AM

## 2022-08-04 NOTE — PLAN OF CARE
Problem: Discharge Planning  Goal: Discharge to home or other facility with appropriate resources  8/3/2022 2312 by Dewayne Nath RN  Outcome: Progressing  8/3/2022 1728 by Najma Patterson RN  Outcome: Progressing  Flowsheets (Taken 8/3/2022 1726)  Discharge to home or other facility with appropriate resources: Identify barriers to discharge with patient and caregiver     Problem: Safety - Adult  Goal: Free from fall injury  8/3/2022 2312 by Dewayne Nath RN  Outcome: Progressing  8/3/2022 1728 by Najma Patterson RN  Outcome: Progressing     Problem: Skin/Tissue Integrity  Goal: Absence of new skin breakdown  Description: 1. Monitor for areas of redness and/or skin breakdown  2. Assess vascular access sites hourly  3. Every 4-6 hours minimum:  Change oxygen saturation probe site  4. Every 4-6 hours:  If on nasal continuous positive airway pressure, respiratory therapy assess nares and determine need for appliance change or resting period.   8/3/2022 2312 by Dewayne Nath RN  Outcome: Progressing  8/3/2022 1728 by Najma Patterson RN  Outcome: Progressing

## 2022-08-04 NOTE — PROGRESS NOTES
Maria Victoria Oneal is pt's [employer] Rep, per patient's niece. Baltazar's phone number is [phone]. Pt's niece, Abhijeet Diehl, can be reached at [phone] for any questions.

## 2022-08-04 NOTE — PROGRESS NOTES
Pharmacy Medication Reconciliation Note     List of medications patient is currently taking is complete. Source of information:   1.  Patient's medication list from [address] [address] [facility]  8/4/2022 9:41 AM

## 2022-08-04 NOTE — DISCHARGE INSTR - COC
Continuity of Care Form    Patient Name: Haven De La Paz   :    MRN:  [id number]    Admit date:  8/3/2022  Discharge date:  ***    Code Status Order: Full Code   Advance Directives:     Admitting Physician:  Kristen Childs MD  PCP: Chaparrita Nicholas    Discharging Nurse: [facility] Unit/Room#: A6S-0169/6594-03  Discharging Unit Phone Number: ***    Emergency Contact:   Extended Emergency Contact Information  Primary Emergency Contact: Annita Amos  Home Phone: [phone]  Mobile Phone: [phone]  Relation: Neighbor   needed? No  Secondary Emergency Contact: [address] Phone: [phone]  Mobile Phone: [phone]  Relation: Niece/Nephew    Past Surgical History:  History reviewed. No pertinent surgical history.     Immunization History:   Immunization History   Administered Date(s) Administered    COVID-19, PFIZER PURPLE top, DILUTE for use, (age 15 y+), 30mcg/0.3mL 2021, 2021       Active Problems:  Patient Active Problem List   Diagnosis Code    Slurred speech R47.81       Isolation/Infection:   Isolation            No Isolation          Patient Infection Status       None to display            Nurse Assessment:  Last Vital Signs: BP (!) 159/64   Pulse 66   Temp 98 °F (36.7 °C) (Oral)   Resp 15   Ht 5' 5\" (1.651 m)   Wt 212 lb 11.9 oz (96.5 kg)   SpO2 91%   BMI 35.40 kg/m²     Last documented pain score (0-10 scale): Pain Level: 0  Last Weight:   Wt Readings from Last 1 Encounters:   22 212 lb 11.9 oz (96.5 kg)     Mental Status:  {IP PT MENTAL STATUS:51179}    IV Access:  { MEHRDAD IV ACCESS:296833992}    Nursing Mobility/ADLs:  Walking   {CHP DME QMNI:488352372}  Transfer  {CHP DME LHFP:840814798}  Bathing  {CHP DME ODWO:743679199}  Dressing  {CHP DME QLAL:770486048}  Toileting  {CHP DME HKHS:983362002}  Feeding  {CHP DME AIAN:850947408}  Med Admin  {CHP DME SRXX:935900778}  Med Delivery   { MEHRDAD MED Delivery:357629894}    Wound Care Documentation and Therapy:        Elimination:  Continence: Bowel: {YES / UU:92682}  Bladder: {YES / LO:04486}  Urinary Catheter: {Urinary Catheter:459805927}   Colostomy/Ileostomy/Ileal Conduit: {YES / HN:05400}       Date of Last BM: 2022    Intake/Output Summary (Last 24 hours) at 2022 1257  Last data filed at 2022 1122  Gross per 24 hour   Intake 240 ml   Output --   Net 240 ml     No intake/output data recorded.     Safety Concerns:     [employer] Safety Concerns:368650927}    Impairments/Disabilities:      [employer] Impairments/Disabilities:361090433}    Nutrition Therapy:  Current Nutrition Therapy:   [employer] Diet List:990645517}    Routes of Feeding: {CHP DME Other Feedings:697222066}  Liquids: {Slp liquid thickness:08401}  Daily Fluid Restriction: {CHP DME Yes amt example:603957999}  Last Modified Barium Swallow with Video (Video Swallowing Test): {Done Not Done YNPK:993564016}    Treatments at the Time of Hospital Discharge:   Respiratory Treatments: ***  Oxygen Therapy:  {Therapy; copd oxygen:14945}  Ventilator:    { CC Vent NOBI:493256151}    Rehab Therapies: {THERAPEUTIC INTERVENTION:1702614072}  Weight Bearing Status/Restrictions: [employer] Weight Bearin}  Other Medical Equipment (for information only, NOT a DME order):  {EQUIPMENT:963152376}  Other Treatments: ***    Patient's personal belongings (please select all that are sent with patient):  {[facility] DME Belongings:595922397}    RN SIGNATURE:  {Esignature:391766837}    CASE MANAGEMENT/SOCIAL WORK SECTION    Inpatient Status Date: ***    Readmission Risk Assessment Score:  Readmission Risk              Risk of Unplanned Readmission:  0           Discharging to Facility/ Agency   Name:   Address:  Phone:  Fax:    Dialysis Facility (if applicable)   Name:  Address:  Dialysis Schedule:  Phone:  Fax:    / signature: {Esignature:409950352}    PHYSICIAN SECTION    Prognosis: Good    Condition at Discharge: Stable    Rehab Potential (if transferring to Rehab): Good    Recommended Labs or Other Treatments After Discharge: follow up with your PCP in 1 week    Physician Certification: I certify the above information and transfer of Giuseppe Gómez  is necessary for the continuing treatment of the diagnosis listed and that she requires [place] for greater 30 days.      Update Admission H&P: No change in H&P    PHYSICIAN SIGNATURE:  Electronically signed by Helene Garcias MD on 8/5/22 at 1:35 PM EDT

## 2022-08-04 NOTE — PROGRESS NOTES
Occupational Therapy  Facility/Department: [address] PROGRESSIVE CARE  Occupational Therapy Initial Assessment and Tentative D/C      Name: Jessi Portillo  :   MRN: [id number]  Date of Service: 2022    Discharge Recommendations: Jessi Portillo scored a 17/24 on the AM-PAC ADL Inpatient form. Current research shows that an AM-PAC score of 17 or less is typically not associated with a discharge to the patient's home setting. Based on the patient's AM-PAC score and their current ADL deficits, it is recommended that the patient have 3-5 sessions per week of Occupational Therapy at d/c to increase the patient's independence. Please see assessment section for further patient specific details. If patient discharges prior to next session this note will serve as a discharge summary. Please see below for the latest assessment towards goals. Continue to assess pending progress, 3-5 sessions per week, Patient would benefit from continued therapy after discharge  OT Equipment Recommendations  Equipment Needed: No       Patient Diagnosis(es): The encounter diagnosis was Aphasia. Past Medical History:  has a past medical history of COPD (chronic obstructive pulmonary disease) ([facility] [address] Hyperlipidemia, Hypertension, Hypothyroid, Neuropathy, peripheral, autonomic, idiopathic, Rhabdomyolysis, and S/P insertion of brain-responsive neurostimulation device. Past Surgical History:  has no past surgical history on file. Assessment   Performance deficits / Impairments: Decreased functional mobility ; Decreased balance;Decreased ADL status; Decreased endurance;Decreased high-level IADLs;Decreased strength  Assessment: Jessi Portillo  is a 68 y.o. female that history who presents brought by EMS complaining of slurred speech. Patient stated that 1 hour before she presented home aide noticed that her speech was slurred and that she had a facial droop.   Patient stated that she was having difficulties finding words.  Symptoms have improved by the time patient got to the emergency room. Per EMS her NIH stroke scale was 0. Patient still believe that her speech is a little bit different. Denies any fevers chills nausea vomiting. PTA pt from assisted living. Pt currently requires Mod A for bed mobility. Pt completes sit/stand with Mod A and RW. Pt with significant noted posterior lean. Anticipate pt needing up to Max A for ADLs. Pt will benefit from skilled OT services at this time. Anticipate pt with need for ongoing OT at time of D/C. Prognosis: Good  Decision Making: Low Complexity  Exam: see above  Assistance / Modification: RW  REQUIRES OT FOLLOW-UP: Yes  Activity Tolerance  Activity Tolerance: Patient Tolerated treatment well        Plan   Plan  Times per Week: 3-5x  Current Treatment Recommendations: Strengthening, Functional mobility training, Endurance training, Self-Care / ADL, Patient/Caregiver education & training, Safety education & training, Balance training     Restrictions  Restrictions/Precautions  Restrictions/Precautions: Fall Risk  Position Activity Restriction  Other position/activity restrictions: CVA R/O    Subjective   General  Chart Reviewed: Yes  Patient assessed for rehabilitation services?: Yes  Additional Pertinent Hx: per MD note, Janae Marte  is a 68 y.o. female that history who presents brought by EMS complaining of slurred speech. Patient stated that 1 hour before she presented home aide noticed that her speech was slurred and that she had a facial droop. Patient stated that she was having difficulties finding words. Symptoms have improved by the time patient got to the emergency room. Per EMS her NIH stroke scale was 0. Patient still believe that her speech is a little bit different. Denies any fevers chills nausea vomiting. Family / Caregiver Present: No  Referring Practitioner: Jerome Newman MD  Subjective  Subjective: Pt agreeable to OT evaluation. Pt reports no pain. Social/Functional History  Social/Functional History  Lives With: Alone  Type of Home: Assisted living [facility]  Home Layout: One level  Home Access: Level entry  Bathroom Shower/Tub: Walk-in shower  Bathroom Toilet: Handicap height  Bathroom Equipment: Grab bars in shower  Home Equipment: Walker, rolling, Wheelchair-electric  ADL Assistance:  (Help as needed for bathing/dressing. (I) toileting.)  Ambulation Assistance: Independent (Uses RW in room (I), wc for longer distances)  Transfer Assistance: Independent  Additional Comments: No hx of falls       Objective   Heart Rate: 66  Heart Rate Source: Monitor  BP: (!) 159/64  BP Location: Left upper arm  BP Method: Automatic  Patient Position: Semi fowlers  MAP (Calculated): 95.67  Resp: 15  SpO2: 91 %  O2 Device: None (Room air)          Observation/Palpation  Observation: Significant posterior lean sitting EOB, and when attempting to stand, requiring physical assist to correct. Safety Devices  Type of Devices: All fall risk precautions in place;Call light within reach; Chair alarm in place;Gait belt;Left in chair;Nurse notified  Restraints  Restraints Initially in Place: No  Bed Mobility Training  Bed Mobility Training: Yes  Overall Level of Assistance: Moderate assistance  Supine to Sit: Moderate assistance (HOB elevated)  Balance  Sitting: Impaired  Sitting - Static: Poor (constant support)  Sitting - Dynamic: Poor (constant support)  Standing: Impaired (RW)  Standing - Static: Fair  Standing - Dynamic: Poor  Transfer Training  Transfer Training: Yes  Overall Level of Assistance: Moderate assistance (RW; posterior lean noted)  Sit to Stand: Moderate assistance (RW)  Stand to Sit: Moderate assistance  Bed to Chair: Moderate assistance (RW)  Toilet Transfer:  Moderate assistance (RW; grab bar)  Gait  Overall Level of Assistance: Minimum assistance (RW; no overt LOB; posterior lean noted)  Distance (ft): 25 Feet (25ft x2)  Assistive Device: Melinda Pandya

## 2022-08-04 NOTE — PROGRESS NOTES
Hospitalist Progress Note      PCP: Isaiah Molina    Chief Complaint. Patient is a 66-year-old female who presented to hospital for difficulty finding words. Patient mentions she has chronic bilateral lower extremity numbness otherwise denies any new numbness tingling sensation, weakness lightheadedness. Patient also denied chest pain shortness of breath nausea vomiting diarrhea constipation dysuria fevers and chills. Patient was further evaluated and admitted for stroke work-up. Date of Admission: 8/3/2022    Subjective:   denies chest pain, nausea, vomiting, shortness of breath, fever or chills. mention feels overall better    Medications:  Reviewed    Infusion Medications    sodium chloride       Scheduled Medications    tiotropium  2 puff Inhalation Daily    [START ON 8/5/2022] clopidogrel  75 mg Oral Daily    sodium chloride flush  10 mL IntraVENous 2 times per day    enoxaparin  40 mg SubCUTAneous Nightly    aspirin  81 mg Oral Daily    Or    aspirin  300 mg Rectal Daily    carbidopa-levodopa  1 tablet Oral TID    carvedilol  12.5 mg Oral BID WC    docusate sodium  200 mg Oral Daily    escitalopram  10 mg Oral Daily    hydrALAZINE  25 mg Oral TID    levothyroxine  112 mcg Oral Daily    losartan  50 mg Oral BID    pantoprazole  40 mg Oral BID AC    polyethylene glycol  17 g Oral Daily    Vitamin D  1,000 Units Oral Daily     PRN Meds: perflutren lipid microspheres, sodium chloride flush, sodium chloride, ondansetron **OR** ondansetron, hydrALAZINE      Intake/Output Summary (Last 24 hours) at 8/4/2022 1759  Last data filed at 8/4/2022 1406  Gross per 24 hour   Intake 600 ml   Output --   Net 600 ml       Physical Exam Performed:    BP (!) 183/87   Pulse 58   Temp 97.4 °F (36.3 °C) (Oral)   Resp 14   Ht 5' 5\" (1.651 m)   Wt 212 lb 11.9 oz (96.5 kg)   SpO2 91%   BMI 35.40 kg/m²     General appearance: No apparent distress,   HEENT:  Conjunctivae/corneas clear.   Neck: Supple, with full range of motion. Respiratory:  Normal respiratory effort. Clear to auscultation, bilaterally without Rales/Wheezes/Rhonchi. Cardiovascular: Regular rate and rhythm with normal S1/S2 without murmurs or rubs  Abdomen: Soft, non-tender, non-distended, normal bowel sounds. Musculoskeletal: No cyanosis or edema bilaterally  Neurologic:  without any focal sensory/motor deficits. grossly non-focal.  Psychiatric: Alert and oriented, Normal mood  Peripheral Pulses: +2 palpable, equal bilaterally       Labs:   Recent Labs     08/03/22  1416 08/04/22  0937   WBC 6.0 5.3   HGB 14.2 14.6   HCT 40.8 42.7    178     Recent Labs     08/03/22  1416 08/04/22  0937   * 135*   K 4.8 4.6    103   CO2 24 20*   BUN 26* 26*   CREATININE 1.1 1.0   CALCIUM 9.2 9.2     No results for input(s): AST, ALT, BILIDIR, BILITOT, ALKPHOS in the last 72 hours. Recent Labs     08/03/22  1416   INR 1.10     Recent Labs     08/03/22  1416   TROPONINI <0.01       Urinalysis:    No results found for: Dorethia Pall, BACTERIA, RBCUA, BLOODU, [address], [address]    Radiology:  MRI brain without contrast   Final Result   1. No acute intracranial abnormality. No acute infarct. 2. Mild-to-moderate global parenchymal volume loss with moderate chronic   microvascular ischemic changes. CT HEAD WO CONTRAST   Final Result   No acute intracranial abnormality. Stable generalized cerebral atrophy and chronic small vessel white matter   ischemic change. Findings were discussed with MARIELLE TYSON at 2:42 p.m. on 8/3/2022. CTA HEAD NECK W CONTRAST   Final Result   Mild to moderate stenosis in the P2 and P3 segments of the left PCA. No acute abnormality or flow-limiting stenosis in the remainder of the major   arteries of the head and neck.                Assessment/Plan:    Active Hospital Problems    Diagnosis     Slurred speech [R47.81]      Priority: Medium     Patient is a 70-year-old female who presented to hospital for difficulty finding words. Patient mentions she has chronic bilateral lower extremity numbness otherwise denies any new numbness tingling sensation, weakness lightheadedness. Patient also denied chest pain shortness of breath nausea vomiting diarrhea constipation dysuria fevers and chills. Patient was further evaluated and admitted for stroke work-up. Assessment  Aphasia, rule out CVA  COPD  Hypertension  Hyperlipidemia  Chronic bilateral lower extremity neuropathy     Plan  MRI brain without contrast - no acute CVA  Aspirin, patient mentions she is allergic to statin  Echo - negative for PFO  Consult neurology, PT/OT/speech therapy  Stroke protocol  Resume home medications  DVT prophylaxis-Lovenox  Diet: ADULT DIET;  Regular  Code Status: Full Code    PT/OT Eval Status: ordered    Dispo/Plan of care - dc tomorrow, ASA, Plavix - DAPT for 21 days then ASA monotherapy per neuro    Gildardo Li MD

## 2022-08-05 VITALS
HEART RATE: 58 BPM | HEIGHT: 65 IN | WEIGHT: 209.22 LBS | BODY MASS INDEX: 34.86 KG/M2 | DIASTOLIC BLOOD PRESSURE: 76 MMHG | TEMPERATURE: 97.3 F | OXYGEN SATURATION: 93 % | SYSTOLIC BLOOD PRESSURE: 142 MMHG | RESPIRATION RATE: 16 BRPM

## 2022-08-05 PROCEDURE — 2580000003 HC RX 258: Performed by: INTERNAL MEDICINE

## 2022-08-05 PROCEDURE — 94640 AIRWAY INHALATION TREATMENT: CPT

## 2022-08-05 PROCEDURE — 6370000000 HC RX 637 (ALT 250 FOR IP): Performed by: INTERNAL MEDICINE

## 2022-08-05 PROCEDURE — 97535 SELF CARE MNGMENT TRAINING: CPT

## 2022-08-05 PROCEDURE — G0378 HOSPITAL OBSERVATION PER HR: HCPCS

## 2022-08-05 PROCEDURE — 94760 N-INVAS EAR/PLS OXIMETRY 1: CPT

## 2022-08-05 PROCEDURE — 6370000000 HC RX 637 (ALT 250 FOR IP): Performed by: PSYCHIATRY & NEUROLOGY

## 2022-08-05 PROCEDURE — 97530 THERAPEUTIC ACTIVITIES: CPT

## 2022-08-05 RX ORDER — CLOPIDOGREL BISULFATE 75 MG/1
75 TABLET ORAL DAILY
Qty: 21 TABLET | Refills: 0 | Status: SHIPPED | OUTPATIENT
Start: 2022-08-05 | End: 2022-08-26

## 2022-08-05 RX ORDER — LOSARTAN POTASSIUM 25 MG/1
75 TABLET ORAL 2 TIMES DAILY
Qty: 30 TABLET | Refills: 3 | Status: SHIPPED | OUTPATIENT
Start: 2022-08-05

## 2022-08-05 RX ORDER — ASPIRIN 81 MG/1
81 TABLET ORAL DAILY
Qty: 30 TABLET | Refills: 3 | Status: SHIPPED | OUTPATIENT
Start: 2022-08-06

## 2022-08-05 RX ADMIN — TIOTROPIUM BROMIDE INHALATION SPRAY 2 PUFF: 3.12 SPRAY, METERED RESPIRATORY (INHALATION) at 08:33

## 2022-08-05 RX ADMIN — DOCUSATE SODIUM 200 MG: 100 CAPSULE, LIQUID FILLED ORAL at 08:58

## 2022-08-05 RX ADMIN — ESCITALOPRAM OXALATE 10 MG: 10 TABLET ORAL at 08:59

## 2022-08-05 RX ADMIN — CARBIDOPA AND LEVODOPA 1 TABLET: 25; 100 TABLET ORAL at 13:20

## 2022-08-05 RX ADMIN — HYDRALAZINE HYDROCHLORIDE 25 MG: 25 TABLET, FILM COATED ORAL at 08:58

## 2022-08-05 RX ADMIN — LEVOTHYROXINE SODIUM 112 MCG: 0.11 TABLET ORAL at 06:36

## 2022-08-05 RX ADMIN — SODIUM CHLORIDE, PRESERVATIVE FREE 10 ML: 5 INJECTION INTRAVENOUS at 10:30

## 2022-08-05 RX ADMIN — ASPIRIN 81 MG: 81 TABLET, COATED ORAL at 08:58

## 2022-08-05 RX ADMIN — CARBIDOPA AND LEVODOPA 1 TABLET: 25; 100 TABLET ORAL at 08:58

## 2022-08-05 RX ADMIN — HYDRALAZINE HYDROCHLORIDE 25 MG: 25 TABLET, FILM COATED ORAL at 13:20

## 2022-08-05 RX ADMIN — PANTOPRAZOLE SODIUM 40 MG: 40 TABLET, DELAYED RELEASE ORAL at 06:36

## 2022-08-05 RX ADMIN — CLOPIDOGREL BISULFATE 75 MG: 75 TABLET ORAL at 08:57

## 2022-08-05 RX ADMIN — CARVEDILOL 12.5 MG: 12.5 TABLET, FILM COATED ORAL at 08:58

## 2022-08-05 RX ADMIN — LOSARTAN POTASSIUM 75 MG: 50 TABLET, FILM COATED ORAL at 08:56

## 2022-08-05 RX ADMIN — Medication 1000 UNITS: at 08:57

## 2022-08-05 NOTE — CARE COORDINATION
CASE MANAGEMENT DISCHARGE SUMMARY:    DISCHARGE DATE: 8/5/2022    DISCHARGED TO [facility]    TRANSPORTATION: via friend, Daren Mack: TBD by patient and bedside RN              COMMENTS: Patient to return to [facility]. Patient feels like she is strong enough to return to her prior setting with prior services. I reviewed therapy recommendations with her and she stated she would follow up with therapy staff at [facility], if needed. I confirmed with Zoë Peterson, Admissions Coordinator at [facility], that patient able to return and would be re-evaluated upon return. No other discharge needs identified at this time.     Electronically signed by Krysten Murphy RN on 8/5/2022 at 2:31 PM

## 2022-08-05 NOTE — PROGRESS NOTES
Occupational Therapy  Facility/Department: [address] PROGRESSIVE CARE  Daily Treatment Note  NAME: Frankey Berkeley  :   MRN: [id number]    Date of Service: 2022    Discharge Recommendations:  3-5 sessions per week     Frankey Berkeley scored a 14/24 on the AM-PAC ADL Inpatient form. Current research shows that an AM-PAC score of 17 or less is typically not associated with a discharge to the patient's home setting. Based on the patient's AM-PAC score and their current ADL deficits, it is recommended that the patient have 3-5 sessions per week of Occupational Therapy at d/c to increase the patient's independence. Please see assessment section for further patient specific details. If patient discharges prior to next session this note will serve as a discharge summary. Please see below for the latest assessment towards goals. Patient Diagnosis(es): The encounter diagnosis was Aphasia. Assessment    Assessment: Pt continues to required up to mod A for sit<>stands transfers and min/CGA for mobility due to balance loss. Anticipate the need for ongoing OT in order to increase functional status prior to returning home. Activity Tolerance: Patient tolerated treatment well;Patient limited by fatigue  Discharge Recommendations: 3-5 sessions per week      Plan   Plan  Times per Week: 3-5x  Current Treatment Recommendations: Strengthening; Functional mobility training; Endurance training;Self-Care / ADL; Patient/Caregiver education & training; Safety education & training;Balance training     Restrictions  Restrictions/Precautions  Restrictions/Precautions: Fall Risk  Position Activity Restriction  Other position/activity restrictions: CVA negative with MRI    Subjective   Subjective  Subjective: Pt in bed, agreeable to therapy with encouragement. Pt reporting possible d/c today. Pain: Pt denies pain at this time.   Orientation  Overall Orientation Status: Within Normal Limits  Cognition  Overall Cognitive Status: Exceptions  Arousal/Alertness: Appropriate responses to stimuli  Following Commands: Follows multistep commands with increased time  Attention Span: Appears intact  Memory: Appears intact  Safety Judgement: Decreased awareness of need for safety;Decreased awareness of need for assistance  Problem Solving: Assistance required to generate solutions;Assistance required to implement solutions;Assistance required to identify errors made  Insights: Decreased awareness of deficits  Initiation: Requires cues for some        Objective       Bed Mobility Training  Bed Mobility Training: Yes  Overall Level of Assistance: Maximum assistance  Supine to Sit: Maximum assistance;Assist X1;Additional time (Increaased time; HOB elevated)  Sit to Supine: Other (comment) (pt up in chair at end of session)  Scooting: Moderate assistance  Balance  Sitting: Impaired (leaning back at side of bed)  Standing: Impaired (up to RW; mod A for posterior balance loss)  Transfer Training  Transfer Training: Yes  Overall Level of Assistance: Moderate assistance (up to RW; significant posterior balance requiring assist and cues to lean forward)  Sit to Stand: Moderate assistance  Stand to Sit: Moderate assistance  Bed to Chair: Moderate assistance  Toilet Transfer: Moderate assistance (RW + grab bar- decreased decent control)  Gait  Overall Level of Assistance: Assist X1;Contact-guard assistance (CGA from mobility to and from bathroom using RW)     ADL  Grooming: Contact guard assistance;Minimal assistance  Grooming Skilled Clinical Factors: CGA-MIN A for balance standing with RW for oral care  LE Dressing: Maximum assistance  Toileting: Dependent/Total        Safety Devices  Type of Devices: All fall risk precautions in place;Call light within reach; Chair alarm in place;Gait belt;Left in chair;Nurse notified     Patient Education  Education Given To: Patient  Education Provided: Role of Therapy;Plan of Care;Transfer Training;ADL Adaptive

## 2022-08-05 NOTE — PLAN OF CARE
Problem: Discharge Planning  Goal: Discharge to home or other facility with appropriate resources  8/5/2022 0136 by Miesha Summers RN  Outcome: Progressing  8/4/2022 1216 by Melissa Salguero RN  Outcome: Progressing     Problem: Safety - Adult  Goal: Free from fall injury  Outcome: Progressing     Problem: Skin/Tissue Integrity  Goal: Absence of new skin breakdown  Description: 1. Monitor for areas of redness and/or skin breakdown  2. Assess vascular access sites hourly  3. Every 4-6 hours minimum:  Change oxygen saturation probe site  4. Every 4-6 hours:  If on nasal continuous positive airway pressure, respiratory therapy assess nares and determine need for appliance change or resting period.   Outcome: Progressing     Problem: Neurosensory - Adult  Goal: Achieves stable or improved neurological status  Outcome: Progressing  Goal: Achieves maximal functionality and self care  Outcome: Progressing

## 2022-08-05 NOTE — CARE COORDINATION
Patient came from [address] [place] [address] prior to arrival.  Call to Richlandsam Vargas, [employer], at [facility] who confirmed the patient is:  [] [address], no Precert required for return.  [] [facility] will be required for return. [] Skilled Nursing Care, no Precert required for return. [] [address] required for return. [x] Is fully vaccinated for Covid. [] Has started Covid vaccination, but is not complete. [] Is not vaccinated for Covid. [x] No Covid Test needed for return.  [] Rapid Covid test needed before return within: [] 48 hours, [] 72 hours, [] 5 days, [] other   [] PCR Covid test needed before return. [x] Confirmed with the patient or family that the plan is to return to this facility at D/C. [] Patient and/or designated decision maker does not plan for the patient to return to this facility at D/C.      Electronically signed by Alyssa Conte RN on 8/5/2022 at 2:26 PM

## 2022-09-14 NOTE — DISCHARGE SUMMARY
Hospital Medicine Discharge Summary    Patient ID: Kj Rouse      Patient's PCP: Gabriel Rose    Admit Date: 8/3/2022     Discharge Date: 8/5/2022    Admitting Physician: Rei Fernandez MD     Discharge Physician: Rei Fernandez MD     Discharge Diagnoses: Active Hospital Problems    Diagnosis Date Noted    Slurred speech [R47.81] 08/03/2022     Priority: Medium       The patient was seen and examined on day of discharge and this discharge summary is in conjunction with any daily progress note from day of discharge. Condition at discharge - stable    Hospital Course: patient seen and evaluated on the day of discharge. Patient informed about following up with appointments. Patient verbalized understanding for follow-up appointments. The patient and / or the family were informed of the results of tests, a time was given to answer questions, a plan was proposed and they agreed with plan. Medical reconciliation performed. Patient discharged stable condition. On the date of discharge, the patient reported feeling stable. The patient was found to not be in any acute distress, with vital signs within normal limits, and no new abnormalities on physical examination. Further, the patient expressed appropriate understanding of, and agreement with, the discharge recommendations, medications, and plan. Patient is a 68-year-old female who presented to hospital for difficulty finding words. Patient mentions she has chronic bilateral lower extremity numbness otherwise denies any new numbness tingling sensation, weakness lightheadedness. Patient also denied chest pain shortness of breath nausea vomiting diarrhea constipation dysuria fevers and chills. Patient was further evaluated and admitted for stroke work-up.      Assessment  Aphasia, rule out CVA  COPD  Hypertension  Hyperlipidemia  Chronic bilateral lower extremity neuropathy     Plan  MRI brain without contrast - no acute CVA  Aspirin, patient mentions she is allergic to statin  Echo - negative for PFO  Consult neurology, PT/OT/speech therapy  Stroke protocol  Resume home medications  DVT prophylaxis-Lovenox  Diet: ADULT DIET; Regular  Code Status: Full Code     PT/OT Eval Status: ordered     Dispo/Plan of care - dc on ASA, Plavix - DAPT for 21 days then ASA monotherapy per neuro     Exam:     BP (!) 142/76   Pulse 58   Temp 97.3 °F (36.3 °C) (Oral)   Resp 16   Ht 5' 5\" (1.651 m)   Wt 209 lb 3.5 oz (94.9 kg)   SpO2 93%   BMI 34.82 kg/m²     General appearance: No apparent distress  HEENT:  Conjunctivae/corneas clear. Neck: Supple, No jugular venous distention. Respiratory:  Normal respiratory effort. Clear to auscultation, bilaterally without Rales/Wheezes/Rhonchi. Cardiovascular: Regular rate and rhythm with normal S1/S2 without murmurs, rubs or gallops. Abdomen: Soft, non-tender, non-distended, normal bowel sounds. Musculoskelatal: No clubbing, cyanosis or edema bilaterally. Skin: Skin color, texture, turgor normal.   Neurologic: no focal neurologic deficits. grossly non-focal.  Psychiatric: Alert and oriented, normal mood    Consults:     IP CONSULT TO STROKE TEAM  IP CONSULT TO PHARMACY  PHARMACY TO CHANGE BASE FLUIDS  IP CONSULT TO HOSPITALIST  IP CONSULT TO NEUROLOGY  IP CONSULT TO CASE MANAGEMENT      Code Status:  Prior    Activity: activity as tolerated    Labs:  For convenience and continuity at follow-up the following most recent labs are provided:      CBC:    Lab Results   Component Value Date/Time    WBC 9.7 08/24/2022 03:50 PM    HGB 12.9 08/24/2022 03:50 PM    HCT 37.2 08/24/2022 03:50 PM     08/24/2022 03:50 PM       Renal:    Lab Results   Component Value Date/Time     08/24/2022 03:50 PM    K 4.4 08/24/2022 03:50 PM    K 4.6 08/04/2022 09:37 AM     08/24/2022 03:50 PM    CO2 26 08/24/2022 03:50 PM    BUN 23 08/24/2022 03:50 PM    CREATININE 1.3 08/24/2022 03:50 PM    CALCIUM 8.7 08/24/2022 03:50 PM       Discharge Medications:     Discharge Medication List as of 8/5/2022  2:08 PM             Details   aspirin 81 MG EC tablet Take 1 tablet by mouth in the morning., Disp-30 tablet, R-3Normal      clopidogrel (PLAVIX) 75 MG tablet Take 1 tablet by mouth in the morning for 21 days. , Disp-21 tablet, R-0Normal                Details   losartan (COZAAR) 25 MG tablet Take 3 tablets by mouth in the morning and at bedtime, Disp-30 tablet, R-3Normal                Details   Emollient (EUCERIN) lotion Apply topically 2 times daily, Topical, 2 TIMES DAILY, Historical Med      tiotropium (SPIRIVA RESPIMAT) 1.25 MCG/ACT AERS inhaler Inhale 2 puffs into the lungs dailyHistorical Med      !! acetaminophen (TYLENOL) 500 MG tablet Take 1,000 mg by mouth in the morning. Historical Med      !! acetaminophen (TYLENOL) 325 MG tablet Take 650 mg by mouth every 4 hours as needed for PainHistorical Med      carbidopa-levodopa (SINEMET)  MG per tablet Take 1 tablet by mouth in the morning and 1 tablet at noon and 1 tablet before bedtime. Historical Med      vitamin D 25 MCG (1000 UT) CAPS Take 1 capsule by mouth in the morning. Historical Med      docusate sodium (COLACE) 100 MG capsule Take 200 mg by mouth in the morning. Historical Med      carvedilol (COREG) 12.5 MG tablet Take 12.5 mg by mouth in the morning and 12.5 mg in the evening. Take with meals. Historical Med      escitalopram (LEXAPRO) 10 MG tablet Take 10 mg by mouth in the morning. Historical Med      hydrALAZINE (APRESOLINE) 25 MG tablet Take 25 mg by mouth in the morning and 25 mg at noon and 25 mg before bedtime. Historical Med      levothyroxine (SYNTHROID) 112 MCG tablet Take 112 mcg by mouth in the morning. Historical Med      polyethylene glycol (GLYCOLAX) 17 g packet Take 17 g by mouth in the morning. Historical Med      omeprazole (PRILOSEC) 20 MG delayed release capsule Take 20 mg by mouth in the morning. Historical Med       !! - Potential duplicate medications found. Please discuss with provider. Time Spent on discharge is more than 30 mints in the examination, evaluation, counseling and review of medications and discharge plan. Signed:    Gudelia Lamb MD   9/14/2022      Thank you Keren Francis for the opportunity to be involved in this patient's care. If you have any questions or concerns please feel free to contact me at [phone].

## 2022-10-03 ENCOUNTER — TELEPHONE (OUTPATIENT)
Dept: CARDIOLOGY CLINIC | Age: 77
End: 2022-10-03

## 2022-10-03 ENCOUNTER — NURSE ONLY (OUTPATIENT)
Dept: CARDIOLOGY CLINIC | Age: 77
End: 2022-10-03

## 2022-10-03 DIAGNOSIS — G45.9 TIA (TRANSIENT ISCHEMIC ATTACK): Primary | ICD-10-CM

## 2022-10-03 DIAGNOSIS — R00.2 PALPITATIONS: ICD-10-CM

## 2022-10-03 NOTE — TELEPHONE ENCOUNTER
Pt came into the office today for a 30 day cardiac monitor ordered by [employer]. Went over instructions with pt including patch placement, charging equipment, how to change patch and when. Also went over how to record a symptom and how to return monitor. Pt was a little uneasy with the process. She states she will do the best she can. Since she is at a nursing facility suggested that she have nursing staff to help and I took out the booklet for her to show them that will help with the process. Also if she or staff and any questions about the monitor to call [employer] at the number on the back of the box. V/u. Monitor placed by Jono Rivas [address]  Length of monitor 30 days  Monitor ordered by [employer]  Serial number [id number]  Kit ID N/A  Activation successful prior to pt leaving office?  Yes

## 2022-10-03 NOTE — PROGRESS NOTES
Pt came into the office today for a 30 day cardiac monitor ordered by [email or web address]. Went over instructions with pt including patch placement, charging equipment, how to change patch and when. Also went over how to record a symptom and how to return monitor. Pt was a little uneasy with the process. She states she will do the best she can. Since she is at a nursing facility suggested that she have nursing staff to help and I took out the booklet for her to show them that will help with the process. Also if she or staff and any questions about the monitor to call [employer] at the number on the back of the box. V/u. Monitor placed by Jono Rivas [address]  Length of monitor 30 days  Monitor ordered by [employer]  Serial number [id number]  Kit ID N/A  Activation successful prior to pt leaving office?  Yes

## 2022-11-04 DIAGNOSIS — G45.9 TIA (TRANSIENT ISCHEMIC ATTACK): Primary | ICD-10-CM

## 2022-11-04 PROCEDURE — 93228 REMOTE 30 DAY ECG REV/REPORT: CPT | Performed by: INTERNAL MEDICINE

## 2023-06-19 NOTE — CONSULTS
Neurology consult Note      Patient: David Ta MRN: [id number]    YOB: 1945  Age: 68 y.o. Sex: female   Unit: WS 5N [facility] Room/Bed: R3U-1202/5277-01 Location: [address] [place] [address]    Date of Consultation: 8/4/2022  Date of Admission: 8/3/2022  1:58 PM ( LOS: 0 days )     Reason for Consult: \"CVA\"    Chief Complaint:   Speech changes    History of Present Illness:    History obtained from chart review, history from patient, and her friend Shaylee Nguyen at the bedside. Consult requested by Dr. Divina Pond. David Ta is a 77y/F with a past medical history of hypertension, hyperlipidemia, COPD, chronic bilateral lower extremity paresthesias from neuropathy. She presented yesterday to the hospital with slurred speech. Around one hour before she presented, her home aide noticed that her speech was slurred and that she had a facial droop. The patient also had difficulty finding words. Symptoms had improved by the time she got to the emergency room. NIH stroke scale was 0.    BP in the ER was 151/79. CT head without any acute abnormalities. She denied any other neurological symptoms. She was admitted for CVA workup. Neurology was consulted for aphasia. She has a statin allergy, hence is not on one. Non-smoker. When she checked her BP yesterday, SBP was in the 200's. Her PCP has been trying to adjust antiHTN medications recently. Last September, she slipped in the shower and fell. She lay on the floor for around 4 days. She was encephalopathic when found. Since then she has had some cognitive difficulties. She has had slow processing and some short term issues at baseline. Not been formally evaluated.         Past Medical History:  The patient has  has a past medical history of COPD (chronic obstructive pulmonary disease) ([facility] [address] Hyperlipidemia, Hypertension, Hypothyroid, Neuropathy, peripheral, autonomic, idiopathic, Rhabdomyolysis, and S/P insertion of RECORDS STATUS - ALL OTHER DIAGNOSIS      RECORDS RECEIVED FROM: [facility]   DATE RECEIVED:    NOTES STATUS DETAILS   OFFICE NOTE from referring provider Epic 06/12/23: Dr. Navin Fisher   OFFICE NOTE from other specialist Epic 06/16/23: Dr. Yessica Parada   OPERATIVE REPORT External: [facility]  04/30/23: [facility]   MEDICATION LIST [facility]    LABS     PATHOLOGY REPORTS Report in Epic FNA:  06/12/23: [id number]     External (report in media tab):  FNA: 05/03/23   ANYTHING RELATED TO DIAGNOSIS Epic Most recent 06/13/23   IMAGING (NEED IMAGES & REPORT)     CT SCANS PACS 06/13/23: CT CAP  06/13/23: CT Soft Tissue Neck     External (report in media tab):  05/04/23: CT CAP   MRI Report only External (report in media tab):  05/04/23: MR Nasopharynx   PET PACS 06/13/23: PET Onc Eyes to Thighs      brain-responsive neurostimulation device. Past Surgical History:  History reviewed. No pertinent surgical history. Family History:  family history is not on file. Social History:  she reports that she has never smoked. She has never used smokeless tobacco. She reports that she does not drink alcohol and does not use drugs. Medications: Allergies   Allergen Reactions    Amlodipine Besylate     Erythromycin      Other reaction(s): Other - comment required  Yeast infection  Yeast infection      Isoniazid Rash    Meperidine Hcl     Nickel Itching    Statins Myalgia    Sulfa Antibiotics Rash    Amlodipine Headaches    Rosuvastatin      Myalgia    Sulfites        Medications Prior to Admission: Emollient (EUCERIN) lotion, Apply topically 2 times daily  tiotropium (SPIRIVA RESPIMAT) 1.25 MCG/ACT AERS inhaler, Inhale 2 puffs into the lungs daily  acetaminophen (TYLENOL) 500 MG tablet, Take 1,000 mg by mouth in the morning. acetaminophen (TYLENOL) 325 MG tablet, Take 650 mg by mouth every 4 hours as needed for Pain  carbidopa-levodopa (SINEMET)  MG per tablet, Take 1 tablet by mouth in the morning and 1 tablet at noon and 1 tablet before bedtime. vitamin D 25 MCG (1000 UT) CAPS, Take 1 capsule by mouth in the morning. docusate sodium (COLACE) 100 MG capsule, Take 200 mg by mouth in the morning. carvedilol (COREG) 12.5 MG tablet, Take 12.5 mg by mouth in the morning and 12.5 mg in the evening. Take with meals. escitalopram (LEXAPRO) 10 MG tablet, Take 10 mg by mouth in the morning. hydrALAZINE (APRESOLINE) 25 MG tablet, Take 25 mg by mouth in the morning and 25 mg at noon and 25 mg before bedtime. levothyroxine (SYNTHROID) 112 MCG tablet, Take 112 mcg by mouth in the morning. losartan (COZAAR) 50 MG tablet, Take 50 mg by mouth in the morning and at bedtime  polyethylene glycol (GLYCOLAX) 17 g packet, Take 17 g by mouth in the morning.   omeprazole (PRILOSEC) 20 MG delayed release capsule, Take 20 mg by mouth in the morning. [DISCONTINUED] tiotropium (SPIRIVA) 18 MCG inhalation capsule, Inhale 18 mcg into the lungs in the morning. Review of Systems:  ROS:  Constitutional- No weight loss or fevers  Eyes- No diplopia. No photophobia. Ears/nose/throat- No dysphagia. No Dysarthria  Cardiovascular- No palpitations. No chest pain  Respiratory- No dyspnea. No Cough  Gastrointestinal- No Abdominal pain. No Vomiting. Genitourinary- No incontinence. No urinary retention  Musculoskeletal- No myalgia. No arthralgia  Skin- No rash. No easy bruising. Psychiatric- No depression. No anxiety  Endocrine- No diabetes. No thyroid issues. Hematologic- No bleeding difficulty. No fatigue  Neurologic- as per HPI. OBJECTIVE     Vitals:  Blood pressure (!) 159/64, pulse 66, temperature 98 °F (36.7 °C), temperature source Oral, resp. rate 15, height 5' 5\" (1.651 m), weight 212 lb 11.9 oz (96.5 kg), SpO2 91 %. Neurological Exam:  Exam:  Constitutional    Vital signs: BP, HR, and RR reviewed   General Alert, no distress, well-nourished  Eyes: fundoscopic exam not visualized. Cardiovascular: pulses symmetric in all 4 extremities. No peripheral edema. Psychiatric: cooperative with examination, no  psychotic behavior noted. Neurologic  Mental status:   orientation to person and place and time    General fund of knowledge grossly intact   Memory grossly intact   Attention intact as able to attend well to the exam     Language fluent in conversation   Comprehension intact; follows simple commands    Cranial nerves:   CN2: Pupils are equal round and reactive to light.    CN 3,4,6: extraocular muscles intact,  CN5: facial sensation symmetric   CN7:face symmetric without dysarthria  CN8: hearing grossly intact  CN9: palate elevated symmetrically  CN11: trap full strength on shoulder shrug  CN12: tongue midline with protrusion    Motor:  Normal bulk and tone throughout  No tremor/fasciculation  No cogwheeling/rigidity/bradykinesia  No prontator drift. 5/5 strength in B/L Deltoids, biceps, triceps, wrist flexion and extension, small muscles of hands  5/5 strength in bilateral hip flexion, knee flexion and extension, ankle dorsi and plantar flexion    Sensory:   Light touch intact in all 4 extremities. No sensory extinction on double simultaneous stimulation    Reflexes:   1+ and symmetric. Neg Anderson's    Cerebellar/coordination: finger nose finger normal without ataxia     Gait:   Walks with a walker        Neuro imaging personally reviewed where indicated. Pertinent positives & negatives are addressed in Assessment & Plan section of note    Imaging:    CT HEAD WO CONTRAST   Final Result   No acute intracranial abnormality. Stable generalized cerebral atrophy and chronic small vessel white matter   ischemic change. Findings were discussed with MARIELLE TYSON at 2:42 p.m. on 8/3/2022. CTA HEAD NECK W CONTRAST   Final Result   Mild to moderate stenosis in the P2 and P3 segments of the left PCA. No acute abnormality or flow-limiting stenosis in the remainder of the major   arteries of the head and neck. MRI brain is still pending. Images are not available to review. Laboratory Review: All results below personally reviewed.  Pertinent positives & negatives are addressed in Assessment & Plan section of note  Recent Results (from the past 36 hour(s))   POCT Glucose    Collection Time: 08/03/22  2:09 PM   Result Value Ref Range    POC Glucose 96 70 - 99 mg/dl    Performed on ACCU-CHEK    Basic Metabolic Panel    Collection Time: 08/03/22  2:16 PM   Result Value Ref Range    Sodium 134 (L) 136 - 145 mmol/L    Potassium 4.8 3.5 - 5.1 mmol/L    Chloride 100 99 - 110 mmol/L    CO2 24 21 - 32 mmol/L    Anion Gap 10 3 - 16    Glucose 98 70 - 99 mg/dL    BUN 26 (H) 7 - 20 mg/dL    Creatinine 1.1 0.6 - 1.2 mg/dL    GFR Non- 48 (A) >60    GFR  58 (A) >60    Calcium 9.2 8.3 - 10.6 mg/dL   CBC with Auto Differential    Collection Time: 08/03/22  2:16 PM   Result Value Ref Range    WBC 6.0 4.0 - 11.0 K/uL    RBC 4.51 4.00 - 5.20 M/uL    Hemoglobin 14.2 12.0 - 16.0 g/dL    Hematocrit 40.8 36.0 - 48.0 %    MCV 90.3 80.0 - 100.0 fL    MCH 31.4 26.0 - 34.0 pg    MCHC 34.8 31.0 - 36.0 g/dL    RDW 14.0 12.4 - 15.4 %    Platelets 075 080 - 897 K/uL    MPV 7.5 5.0 - 10.5 fL    Neutrophils % 75.2 %    Lymphocytes % 12.4 %    Monocytes % 8.8 %    Eosinophils % 2.8 %    Basophils % 0.8 %    Neutrophils Absolute 4.5 1.7 - 7.7 K/uL    Lymphocytes Absolute 0.7 (L) 1.0 - 5.1 K/uL    Monocytes Absolute 0.5 0.0 - 1.3 K/uL    Eosinophils Absolute 0.2 0.0 - 0.6 K/uL    Basophils Absolute 0.0 0.0 - 0.2 K/uL   Troponin    Collection Time: 08/03/22  2:16 PM   Result Value Ref Range    Troponin <0.01 <0.01 ng/mL   Protime-INR    Collection Time: 08/03/22  2:16 PM   Result Value Ref Range    Protime 14.1 11.7 - 14.5 sec    INR 1.10 0.87 - 1.14   EKG 12 Lead    Collection Time: 08/03/22  2:51 PM   Result Value Ref Range    Ventricular Rate 59 BPM    Atrial Rate 59 BPM    P-R Interval 162 ms    QRS Duration 76 ms    Q-T Interval 428 ms    QTc Calculation (Bazett) 423 ms    P Axis 52 degrees    R Axis 42 degrees    T Axis 53 degrees    Diagnosis       Sinus bradycardiaOtherwise normal ECGNo previous ECGs availableConfirmed by ETIENNE DEXTER, Laila Model ([id number]) on 8/4/2022 7:50:40 AM   Basic Metabolic Panel w/ Reflex to MG    Collection Time: 08/04/22  9:37 AM   Result Value Ref Range    Sodium 135 (L) 136 - 145 mmol/L    Potassium reflex Magnesium 4.6 3.5 - 5.1 mmol/L    Chloride 103 99 - 110 mmol/L    CO2 20 (L) 21 - 32 mmol/L    Anion Gap 12 3 - 16    Glucose 167 (H) 70 - 99 mg/dL    BUN 26 (H) 7 - 20 mg/dL    Creatinine 1.0 0.6 - 1.2 mg/dL    GFR Non-African American 54 (A) >60    GFR African American >60 >60    Calcium 9.2 8.3 - 10.6 mg/dL   CBC    Collection Time: 08/04/22  9:37 AM   Result

## 2023-06-23 ENCOUNTER — HOSPITAL ENCOUNTER (EMERGENCY)
Age: 78
Discharge: NURSING, REHAB OR LONG-TERM CARE | End: 2023-06-23
Attending: EMERGENCY MEDICINE
Payer: MEDICARE

## 2023-06-23 ENCOUNTER — APPOINTMENT (OUTPATIENT)
Dept: CT IMAGING | Age: 78
End: 2023-06-23
Payer: MEDICARE

## 2023-06-23 ENCOUNTER — APPOINTMENT (OUTPATIENT)
Dept: GENERAL RADIOLOGY | Age: 78
End: 2023-06-23
Payer: MEDICARE

## 2023-06-23 VITALS
TEMPERATURE: 97.5 F | BODY MASS INDEX: 34.86 KG/M2 | SYSTOLIC BLOOD PRESSURE: 150 MMHG | OXYGEN SATURATION: 96 % | DIASTOLIC BLOOD PRESSURE: 68 MMHG | RESPIRATION RATE: 15 BRPM | WEIGHT: 209.22 LBS | HEIGHT: 65 IN | HEART RATE: 55 BPM
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
## 2023-06-23 DIAGNOSIS — N30.00 ACUTE CYSTITIS WITHOUT HEMATURIA: ICD-10-CM

## 2023-06-23 DIAGNOSIS — R41.82 ALTERED MENTAL STATUS, UNSPECIFIED ALTERED MENTAL STATUS TYPE: Primary | ICD-10-CM

## 2023-06-23 LAB
ALBUMIN SERPL-MCNC: 4.1 G/DL (ref 3.4–5)
ALBUMIN/GLOB SERPL: 1.6 {RATIO} (ref 1.1–2.2)
ALP SERPL-CCNC: 89 U/L (ref 40–129)
ALT SERPL-CCNC: <5 U/L (ref 10–40)
ANION GAP SERPL CALCULATED.3IONS-SCNC: 12 MMOL/L (ref 3–16)
APTT BLD: 29.7 SEC (ref 22.7–35.9)
AST SERPL-CCNC: 15 U/L (ref 15–37)
BACTERIA URNS QL MICRO: ABNORMAL /HPF
BASOPHILS # BLD: 0 K/UL (ref 0–0.2)
BASOPHILS NFR BLD: 0.9 %
BILIRUB SERPL-MCNC: 0.5 MG/DL (ref 0–1)
BILIRUB UR QL STRIP.AUTO: NEGATIVE
BUN SERPL-MCNC: 25 MG/DL (ref 7–20)
CALCIUM SERPL-MCNC: 9.3 MG/DL (ref 8.3–10.6)
CHLORIDE SERPL-SCNC: 102 MMOL/L (ref 99–110)
CK SERPL-CCNC: 118 U/L (ref 26–192)
CLARITY UR: ABNORMAL
CO2 SERPL-SCNC: 23 MMOL/L (ref 21–32)
COLOR UR: YELLOW
CREAT SERPL-MCNC: 1 MG/DL (ref 0.6–1.2)
DEPRECATED RDW RBC AUTO: 14 % (ref 12.4–15.4)
EKG ATRIAL RATE: 54 BPM
EKG DIAGNOSIS: NORMAL
EKG P AXIS: 27 DEGREES
EKG P-R INTERVAL: 158 MS
EKG Q-T INTERVAL: 458 MS
EKG QRS DURATION: 78 MS
EKG QTC CALCULATION (BAZETT): 434 MS
EKG R AXIS: 34 DEGREES
EKG T AXIS: 52 DEGREES
EKG VENTRICULAR RATE: 54 BPM
EOSINOPHIL # BLD: 0.3 K/UL (ref 0–0.6)
EOSINOPHIL NFR BLD: 6.1 %
EPI CELLS #/AREA URNS AUTO: 1 /HPF (ref 0–5)
GFR SERPLBLD CREATININE-BSD FMLA CKD-EPI: 58 ML/MIN/{1.73_M2}
GLUCOSE SERPL-MCNC: 112 MG/DL (ref 70–99)
GLUCOSE UR STRIP.AUTO-MCNC: NEGATIVE MG/DL
HCT VFR BLD AUTO: 41.7 % (ref 36–48)
HGB BLD-MCNC: 14.5 G/DL (ref 12–16)
HGB UR QL STRIP.AUTO: NEGATIVE
HYALINE CASTS #/AREA URNS AUTO: 1 /LPF (ref 0–8)
INR PPP: 1.04 (ref 0.84–1.16)
KETONES UR STRIP.AUTO-MCNC: ABNORMAL MG/DL
LEUKOCYTE ESTERASE UR QL STRIP.AUTO: ABNORMAL
LYMPHOCYTES # BLD: 0.6 K/UL (ref 1–5.1)
LYMPHOCYTES NFR BLD: 13.6 %
MCH RBC QN AUTO: 31.6 PG (ref 26–34)
MCHC RBC AUTO-ENTMCNC: 34.7 G/DL (ref 31–36)
MCV RBC AUTO: 91.1 FL (ref 80–100)
MONOCYTES # BLD: 0.4 K/UL (ref 0–1.3)
MONOCYTES NFR BLD: 9.3 %
NEUTROPHILS # BLD: 3.1 K/UL (ref 1.7–7.7)
NEUTROPHILS NFR BLD: 70.1 %
NITRITE UR QL STRIP.AUTO: POSITIVE
PH UR STRIP.AUTO: 5.5 [PH] (ref 5–8)
PLATELET # BLD AUTO: 158 K/UL (ref 135–450)
PMV BLD AUTO: 7.8 FL (ref 5–10.5)
POTASSIUM SERPL-SCNC: 4.6 MMOL/L (ref 3.5–5.1)
PROT SERPL-MCNC: 6.7 G/DL (ref 6.4–8.2)
PROT UR STRIP.AUTO-MCNC: ABNORMAL MG/DL
PROTHROMBIN TIME: 13.6 SEC (ref 11.5–14.8)
RBC # BLD AUTO: 4.58 M/UL (ref 4–5.2)
RBC CLUMPS #/AREA URNS AUTO: 1 /HPF (ref 0–4)
SODIUM SERPL-SCNC: 137 MMOL/L (ref 136–145)
SP GR UR STRIP.AUTO: 1.02 (ref 1–1.03)
TROPONIN, HIGH SENSITIVITY: 24 NG/L (ref 0–14)
TROPONIN, HIGH SENSITIVITY: 26 NG/L (ref 0–14)
UA COMPLETE W REFLEX CULTURE PNL UR: YES
UA DIPSTICK W REFLEX MICRO PNL UR: YES
URN SPEC COLLECT METH UR: ABNORMAL
UROBILINOGEN UR STRIP-ACNC: 1 E.U./DL
WBC # BLD AUTO: 4.4 K/UL (ref 4–11)
WBC #/AREA URNS AUTO: 67 /HPF (ref 0–5)

## 2023-06-23 PROCEDURE — 84484 ASSAY OF TROPONIN QUANT: CPT

## 2023-06-23 PROCEDURE — 72125 CT NECK SPINE W/O DYE: CPT

## 2023-06-23 PROCEDURE — 93010 ELECTROCARDIOGRAM REPORT: CPT | Performed by: INTERNAL MEDICINE

## 2023-06-23 PROCEDURE — 85730 THROMBOPLASTIN TIME PARTIAL: CPT

## 2023-06-23 PROCEDURE — 80053 COMPREHEN METABOLIC PANEL: CPT

## 2023-06-23 PROCEDURE — 96365 THER/PROPH/DIAG IV INF INIT: CPT

## 2023-06-23 PROCEDURE — 71046 X-RAY EXAM CHEST 2 VIEWS: CPT

## 2023-06-23 PROCEDURE — 6360000002 HC RX W HCPCS: Performed by: PHYSICIAN ASSISTANT

## 2023-06-23 PROCEDURE — 96361 HYDRATE IV INFUSION ADD-ON: CPT

## 2023-06-23 PROCEDURE — 82550 ASSAY OF CK (CPK): CPT

## 2023-06-23 PROCEDURE — 93005 ELECTROCARDIOGRAM TRACING: CPT | Performed by: PHYSICIAN ASSISTANT

## 2023-06-23 PROCEDURE — 70450 CT HEAD/BRAIN W/O DYE: CPT

## 2023-06-23 PROCEDURE — 85610 PROTHROMBIN TIME: CPT

## 2023-06-23 PROCEDURE — 81001 URINALYSIS AUTO W/SCOPE: CPT

## 2023-06-23 PROCEDURE — 6370000000 HC RX 637 (ALT 250 FOR IP): Performed by: PHYSICIAN ASSISTANT

## 2023-06-23 PROCEDURE — 99285 EMERGENCY DEPT VISIT HI MDM: CPT

## 2023-06-23 PROCEDURE — 87086 URINE CULTURE/COLONY COUNT: CPT

## 2023-06-23 PROCEDURE — 85025 COMPLETE CBC W/AUTO DIFF WBC: CPT

## 2023-06-23 PROCEDURE — 2580000003 HC RX 258: Performed by: PHYSICIAN ASSISTANT

## 2023-06-23 RX ORDER — 0.9 % SODIUM CHLORIDE 0.9 %
1000 INTRAVENOUS SOLUTION INTRAVENOUS ONCE
Status: COMPLETED | OUTPATIENT
Start: 2023-06-23 | End: 2023-06-23

## 2023-06-23 RX ORDER — ACETAMINOPHEN 500 MG
1000 TABLET ORAL ONCE
Status: COMPLETED | OUTPATIENT
Start: 2023-06-23 | End: 2023-06-23

## 2023-06-23 RX ORDER — CEFUROXIME AXETIL 250 MG/1
250 TABLET ORAL 2 TIMES DAILY
Qty: 14 TABLET | Refills: 0 | Status: SHIPPED | OUTPATIENT
Start: 2023-06-23 | End: 2023-06-30

## 2023-06-23 RX ADMIN — SODIUM CHLORIDE 1000 ML: 9 INJECTION, SOLUTION INTRAVENOUS at 12:18

## 2023-06-23 RX ADMIN — CEFTRIAXONE 1000 MG: 1 INJECTION, POWDER, FOR SOLUTION INTRAMUSCULAR; INTRAVENOUS at 13:15

## 2023-06-23 RX ADMIN — ACETAMINOPHEN 1000 MG: 500 TABLET ORAL at 13:15

## 2023-06-23 ASSESSMENT — PAIN - FUNCTIONAL ASSESSMENT
PAIN_FUNCTIONAL_ASSESSMENT: NONE - DENIES PAIN
PAIN_FUNCTIONAL_ASSESSMENT: NONE - DENIES PAIN

## 2023-06-23 NOTE — ED NOTES
Patients HUSAM Brennan Abler updated on patients disposition at this time. Verbalizes understanding and has no further questions for this RN.       Niharika Vizcarra RN  06/23/23 [id number]

## 2023-06-23 NOTE — ED NOTES
Patients Manpreet Watson concerned about patient missing afternoon dose of BP medication, she also called Charge RN Hilda concerned. Advised her that I would have to get MD order to dispense the medication. Spoke to patient as she is alert and oriented x4 and able to make decisions for herself. Patient requested that she get her medications at the NH as she usually does, she states the nurses dispense them there for her and she would like to keep it that way so things are not mixed up. Patients transport is set to arrived in approx 20 min.       Christopher Brush RN  06/23/23 [id number]

## 2023-06-23 NOTE — PROGRESS NOTES
Medication Reconciliation    List of medications patient is currently taking is complete. Source of information: 1. Conversation with patient at bedside                                      2. EPIC records      Allergies  Amlodipine besylate, Erythromycin, Isoniazid, Meperidine hcl, Nickel, Statins, Sulfa antibiotics, Amlodipine, Rosuvastatin, and Sulfites     Notes regarding home medications:   1.  Patient reports taking AM medications RACHEL Clinton [facility]   6/23/2023  1:47 PM

## 2023-06-23 NOTE — ED TRIAGE NOTES
Pt arrives via ems from [address] Mathew initially altered per staff, pt recalls falling earlier and hitting her head. Denies LOC and denies blood thinners.

## 2023-06-23 NOTE — ED PROVIDER NOTES
[address]      Pt Name: Josselin Rouse  MRN: [id number]  [place] [birth year]  Date of evaluation: 6/23/2023  Provider: Nina Gore DO    CHIEF COMPLAINT  Chief Complaint   Patient presents with    Fall     Pt arrives via ems from [address]  Pt initially altered per staff, pt recalls falling earlier and hitting her head. Denies LOC and denies blood thinners. I have fully participated in the care of Josselin Rouse and have had a face-to-face evaluation. I have reviewed and agree with all pertinent clinical information, and midlevel provider's history, and physical exam. I have also reviewed the labs, EKG, and imaging studies and treatment plan. I have also reviewed and agree with the medications, allergies and past medical history section for this Josselin Rouse. I agree with the diagnosis, and I concur. This patient is at risk for a communicable infection. Therefore, personal protection equipment consisting of a mask was worn for the exam.    Past Medical History:   Diagnosis Date    COPD (chronic obstructive pulmonary disease) ([facility] [address]     Hyperlipidemia     Hypertension     Hypothyroid     Neuropathy, peripheral, autonomic, idiopathic     Rhabdomyolysis     S/P insertion of brain-responsive neurostimulation device        MDM:  Josselin Rouse is a 66 y.o. female who presents with weakness and altered mental status per [facility] staff. She lives in assisted living and they check on her frequently. Patient recalls falling earlier today and hit her head. She denies loss conscious. She is not on blood thinners. Physical exam is nonfocal.  She does not have any focal neurologic signs. She appears generally weak but nontoxic. She looks chronically ill. HEENT reveals dry mucous membranes but no evidence of trauma. Heart is regular rate and rhythm without murmurs clicks or rubs.   Lungs are clear to auscultation
altered mental status type    2. Fall, initial encounter    3. Acute cystitis without hematuria          DISPOSITION/PLAN     DISPOSITION Decision To Admit 06/23/2023 01:08:46 PM      PATIENT REFERRED TO:  No follow-up provider specified.     DISCHARGE MEDICATIONS:  New Prescriptions    No medications on file       DISCONTINUED MEDICATIONS:  Discontinued Medications    No medications on file              (Please note that portions of this note were completed with a voice recognition program.  Efforts were made to edit the dictations but occasionally words are mis-transcribed.)    Elif Blankenship PA-C (electronically signed)            Elif Blankenship PA-C  06/23/23 [address] [place] [address]  06/23/23 0985

## 2023-06-23 NOTE — ED NOTES
Report to EMS crew transporting patient back to [facility]     Alberto Bill RN  06/23/23 [id number]

## 2023-06-24 LAB — BACTERIA UR CULT: NORMAL

## 2024-08-12 ENCOUNTER — APPOINTMENT (OUTPATIENT)
Dept: GENERAL RADIOLOGY | Age: 79
End: 2024-08-12
Payer: MEDICARE

## 2024-08-12 ENCOUNTER — APPOINTMENT (OUTPATIENT)
Dept: CT IMAGING | Age: 79
End: 2024-08-12
Attending: STUDENT IN AN ORGANIZED HEALTH CARE EDUCATION/TRAINING PROGRAM
Payer: MEDICARE

## 2024-08-12 ENCOUNTER — HOSPITAL ENCOUNTER (INPATIENT)
Age: 79
LOS: 3 days | Discharge: NURSING, REHAB OR LONG-TERM CARE | End: 2024-08-15
Attending: STUDENT IN AN ORGANIZED HEALTH CARE EDUCATION/TRAINING PROGRAM | Admitting: INTERNAL MEDICINE
Payer: MEDICARE

## 2024-08-12 DIAGNOSIS — N39.0 ACUTE UTI: ICD-10-CM

## 2024-08-12 DIAGNOSIS — I63.9 CEREBROVASCULAR ACCIDENT (CVA), UNSPECIFIED MECHANISM (HCC): ICD-10-CM

## 2024-08-12 DIAGNOSIS — G45.9 TIA (TRANSIENT ISCHEMIC ATTACK): Primary | ICD-10-CM

## 2024-08-12 PROBLEM — I99.8 TRANSIENT ISCHEMIA: Status: ACTIVE | Noted: 2024-08-12

## 2024-08-12 LAB
ALBUMIN SERPL-MCNC: 4 G/DL (ref 3.4–5)
ALBUMIN/GLOB SERPL: 1.5 {RATIO} (ref 1.1–2.2)
ALP SERPL-CCNC: 103 U/L (ref 40–129)
ALT SERPL-CCNC: <5 U/L (ref 10–40)
ANION GAP SERPL CALCULATED.3IONS-SCNC: 10 MMOL/L (ref 3–16)
AST SERPL-CCNC: 15 U/L (ref 15–37)
BACTERIA URNS QL MICRO: ABNORMAL /HPF
BASOPHILS # BLD: 0 K/UL (ref 0–0.2)
BASOPHILS NFR BLD: 0.6 %
BILIRUB SERPL-MCNC: 0.4 MG/DL (ref 0–1)
BILIRUB UR QL STRIP.AUTO: NEGATIVE
BUN SERPL-MCNC: 25 MG/DL (ref 7–20)
CALCIUM SERPL-MCNC: 8.8 MG/DL (ref 8.3–10.6)
CHLORIDE SERPL-SCNC: 102 MMOL/L (ref 99–110)
CK SERPL-CCNC: 87 U/L (ref 26–192)
CLARITY UR: CLEAR
CO2 SERPL-SCNC: 24 MMOL/L (ref 21–32)
COLOR UR: YELLOW
CREAT SERPL-MCNC: 1 MG/DL (ref 0.6–1.2)
DEPRECATED RDW RBC AUTO: 14 % (ref 12.4–15.4)
EOSINOPHIL # BLD: 0.2 K/UL (ref 0–0.6)
EOSINOPHIL NFR BLD: 3.7 %
EPI CELLS #/AREA URNS AUTO: 2 /HPF (ref 0–5)
GFR SERPLBLD CREATININE-BSD FMLA CKD-EPI: 57 ML/MIN/{1.73_M2}
GLUCOSE SERPL-MCNC: 147 MG/DL (ref 70–99)
GLUCOSE UR STRIP.AUTO-MCNC: NEGATIVE MG/DL
HCT VFR BLD AUTO: 40.3 % (ref 36–48)
HGB BLD-MCNC: 13.7 G/DL (ref 12–16)
HGB UR QL STRIP.AUTO: NEGATIVE
HYALINE CASTS #/AREA URNS AUTO: 0 /LPF (ref 0–8)
KETONES UR STRIP.AUTO-MCNC: ABNORMAL MG/DL
LEUKOCYTE ESTERASE UR QL STRIP.AUTO: ABNORMAL
LYMPHOCYTES # BLD: 0.6 K/UL (ref 1–5.1)
LYMPHOCYTES NFR BLD: 9.4 %
MCH RBC QN AUTO: 30.9 PG (ref 26–34)
MCHC RBC AUTO-ENTMCNC: 33.9 G/DL (ref 31–36)
MCV RBC AUTO: 90.9 FL (ref 80–100)
MONOCYTES # BLD: 0.6 K/UL (ref 0–1.3)
MONOCYTES NFR BLD: 9.9 %
NEUTROPHILS # BLD: 4.8 K/UL (ref 1.7–7.7)
NEUTROPHILS NFR BLD: 76.4 %
NITRITE UR QL STRIP.AUTO: POSITIVE
NT-PROBNP SERPL-MCNC: 258 PG/ML (ref 0–449)
PH UR STRIP.AUTO: 6.5 [PH] (ref 5–8)
PLATELET # BLD AUTO: 189 K/UL (ref 135–450)
PMV BLD AUTO: 8 FL (ref 5–10.5)
POTASSIUM SERPL-SCNC: 4.5 MMOL/L (ref 3.5–5.1)
PROT SERPL-MCNC: 6.6 G/DL (ref 6.4–8.2)
PROT UR STRIP.AUTO-MCNC: NEGATIVE MG/DL
RBC # BLD AUTO: 4.43 M/UL (ref 4–5.2)
RBC CLUMPS #/AREA URNS AUTO: 1 /HPF (ref 0–4)
SODIUM SERPL-SCNC: 136 MMOL/L (ref 136–145)
SP GR UR STRIP.AUTO: 1.04 (ref 1–1.03)
TROPONIN, HIGH SENSITIVITY: 30 NG/L (ref 0–14)
TROPONIN, HIGH SENSITIVITY: 32 NG/L (ref 0–14)
UA COMPLETE W REFLEX CULTURE PNL UR: ABNORMAL
UA DIPSTICK W REFLEX MICRO PNL UR: YES
URN SPEC COLLECT METH UR: ABNORMAL
UROBILINOGEN UR STRIP-ACNC: 1 E.U./DL
WBC # BLD AUTO: 6.3 K/UL (ref 4–11)
WBC #/AREA URNS AUTO: 4 /HPF (ref 0–5)

## 2024-08-12 PROCEDURE — 70450 CT HEAD/BRAIN W/O DYE: CPT

## 2024-08-12 PROCEDURE — 81001 URINALYSIS AUTO W/SCOPE: CPT

## 2024-08-12 PROCEDURE — 99285 EMERGENCY DEPT VISIT HI MDM: CPT

## 2024-08-12 PROCEDURE — 6360000002 HC RX W HCPCS: Performed by: INTERNAL MEDICINE

## 2024-08-12 PROCEDURE — 84484 ASSAY OF TROPONIN QUANT: CPT

## 2024-08-12 PROCEDURE — 1200000000 HC SEMI PRIVATE

## 2024-08-12 PROCEDURE — 82550 ASSAY OF CK (CPK): CPT

## 2024-08-12 PROCEDURE — 2060000000 HC ICU INTERMEDIATE R&B

## 2024-08-12 PROCEDURE — 83880 ASSAY OF NATRIURETIC PEPTIDE: CPT

## 2024-08-12 PROCEDURE — 6360000004 HC RX CONTRAST MEDICATION: Performed by: STUDENT IN AN ORGANIZED HEALTH CARE EDUCATION/TRAINING PROGRAM

## 2024-08-12 PROCEDURE — 93005 ELECTROCARDIOGRAM TRACING: CPT | Performed by: EMERGENCY MEDICINE

## 2024-08-12 PROCEDURE — 2580000003 HC RX 258: Performed by: INTERNAL MEDICINE

## 2024-08-12 PROCEDURE — 6370000000 HC RX 637 (ALT 250 FOR IP): Performed by: INTERNAL MEDICINE

## 2024-08-12 PROCEDURE — 6360000002 HC RX W HCPCS: Performed by: STUDENT IN AN ORGANIZED HEALTH CARE EDUCATION/TRAINING PROGRAM

## 2024-08-12 PROCEDURE — 85025 COMPLETE CBC W/AUTO DIFF WBC: CPT

## 2024-08-12 PROCEDURE — 70496 CT ANGIOGRAPHY HEAD: CPT

## 2024-08-12 PROCEDURE — 80053 COMPREHEN METABOLIC PANEL: CPT

## 2024-08-12 PROCEDURE — 71045 X-RAY EXAM CHEST 1 VIEW: CPT

## 2024-08-12 PROCEDURE — 2580000003 HC RX 258: Performed by: STUDENT IN AN ORGANIZED HEALTH CARE EDUCATION/TRAINING PROGRAM

## 2024-08-12 PROCEDURE — 96374 THER/PROPH/DIAG INJ IV PUSH: CPT

## 2024-08-12 RX ORDER — HYDRALAZINE HYDROCHLORIDE 50 MG/1
50 TABLET, FILM COATED ORAL 3 TIMES DAILY
Status: DISCONTINUED | OUTPATIENT
Start: 2024-08-12 | End: 2024-08-15 | Stop reason: HOSPADM

## 2024-08-12 RX ORDER — PANTOPRAZOLE SODIUM 40 MG/1
40 TABLET, DELAYED RELEASE ORAL
Status: DISCONTINUED | OUTPATIENT
Start: 2024-08-13 | End: 2024-08-15 | Stop reason: HOSPADM

## 2024-08-12 RX ORDER — ENOXAPARIN SODIUM 100 MG/ML
30 INJECTION SUBCUTANEOUS 2 TIMES DAILY
Status: DISCONTINUED | OUTPATIENT
Start: 2024-08-12 | End: 2024-08-15 | Stop reason: HOSPADM

## 2024-08-12 RX ORDER — ESCITALOPRAM OXALATE 10 MG/1
10 TABLET ORAL DAILY
Status: DISCONTINUED | OUTPATIENT
Start: 2024-08-13 | End: 2024-08-15 | Stop reason: HOSPADM

## 2024-08-12 RX ORDER — LEVOTHYROXINE SODIUM 112 UG/1
112 TABLET ORAL DAILY
Status: DISCONTINUED | OUTPATIENT
Start: 2024-08-13 | End: 2024-08-15 | Stop reason: HOSPADM

## 2024-08-12 RX ORDER — POLYETHYLENE GLYCOL 3350 17 G/17G
17 POWDER, FOR SOLUTION ORAL DAILY PRN
Status: DISCONTINUED | OUTPATIENT
Start: 2024-08-12 | End: 2024-08-13

## 2024-08-12 RX ORDER — SODIUM CHLORIDE 9 MG/ML
INJECTION, SOLUTION INTRAVENOUS PRN
Status: DISCONTINUED | OUTPATIENT
Start: 2024-08-12 | End: 2024-08-15 | Stop reason: HOSPADM

## 2024-08-12 RX ORDER — VITAMIN B COMPLEX
2000 TABLET ORAL DAILY
Status: DISCONTINUED | OUTPATIENT
Start: 2024-08-13 | End: 2024-08-15 | Stop reason: HOSPADM

## 2024-08-12 RX ORDER — ASPIRIN 81 MG/1
81 TABLET ORAL DAILY
Status: DISCONTINUED | OUTPATIENT
Start: 2024-08-13 | End: 2024-08-13

## 2024-08-12 RX ORDER — FUROSEMIDE 10 MG/ML
40 INJECTION INTRAMUSCULAR; INTRAVENOUS ONCE
Status: COMPLETED | OUTPATIENT
Start: 2024-08-12 | End: 2024-08-12

## 2024-08-12 RX ORDER — SODIUM CHLORIDE 0.9 % (FLUSH) 0.9 %
5-40 SYRINGE (ML) INJECTION EVERY 12 HOURS SCHEDULED
Status: DISCONTINUED | OUTPATIENT
Start: 2024-08-12 | End: 2024-08-15 | Stop reason: HOSPADM

## 2024-08-12 RX ORDER — SODIUM CHLORIDE 0.9 % (FLUSH) 0.9 %
5-40 SYRINGE (ML) INJECTION PRN
Status: DISCONTINUED | OUTPATIENT
Start: 2024-08-12 | End: 2024-08-15 | Stop reason: HOSPADM

## 2024-08-12 RX ORDER — CARVEDILOL 3.12 MG/1
3.12 TABLET ORAL 2 TIMES DAILY WITH MEALS
Status: DISCONTINUED | OUTPATIENT
Start: 2024-08-13 | End: 2024-08-15 | Stop reason: HOSPADM

## 2024-08-12 RX ORDER — 0.9 % SODIUM CHLORIDE 0.9 %
1000 INTRAVENOUS SOLUTION INTRAVENOUS ONCE
Status: COMPLETED | OUTPATIENT
Start: 2024-08-12 | End: 2024-08-12

## 2024-08-12 RX ORDER — ONDANSETRON 2 MG/ML
4 INJECTION INTRAMUSCULAR; INTRAVENOUS EVERY 6 HOURS PRN
Status: DISCONTINUED | OUTPATIENT
Start: 2024-08-12 | End: 2024-08-15 | Stop reason: HOSPADM

## 2024-08-12 RX ORDER — ONDANSETRON 4 MG/1
4 TABLET, ORALLY DISINTEGRATING ORAL EVERY 8 HOURS PRN
Status: DISCONTINUED | OUTPATIENT
Start: 2024-08-12 | End: 2024-08-15 | Stop reason: HOSPADM

## 2024-08-12 RX ADMIN — ENOXAPARIN SODIUM 30 MG: 100 INJECTION SUBCUTANEOUS at 22:20

## 2024-08-12 RX ADMIN — SODIUM CHLORIDE, PRESERVATIVE FREE 10 ML: 5 INJECTION INTRAVENOUS at 22:24

## 2024-08-12 RX ADMIN — FUROSEMIDE 40 MG: 10 INJECTION, SOLUTION INTRAMUSCULAR; INTRAVENOUS at 23:35

## 2024-08-12 RX ADMIN — WATER 1000 MG: 1 INJECTION INTRAMUSCULAR; INTRAVENOUS; SUBCUTANEOUS at 18:49

## 2024-08-12 RX ADMIN — IOPAMIDOL 75 ML: 755 INJECTION, SOLUTION INTRAVENOUS at 16:40

## 2024-08-12 RX ADMIN — HYDRALAZINE HYDROCHLORIDE 50 MG: 50 TABLET ORAL at 22:20

## 2024-08-12 RX ADMIN — SODIUM CHLORIDE 1000 ML: 9 INJECTION, SOLUTION INTRAVENOUS at 16:23

## 2024-08-12 RX ADMIN — LOSARTAN POTASSIUM 75 MG: 50 TABLET, FILM COATED ORAL at 22:20

## 2024-08-12 RX ADMIN — CARBIDOPA AND LEVODOPA 1 TABLET: 25; 100 TABLET ORAL at 22:20

## 2024-08-12 ASSESSMENT — LIFESTYLE VARIABLES
HOW MANY STANDARD DRINKS CONTAINING ALCOHOL DO YOU HAVE ON A TYPICAL DAY: PATIENT DOES NOT DRINK
HOW OFTEN DO YOU HAVE A DRINK CONTAINING ALCOHOL: NEVER

## 2024-08-12 ASSESSMENT — PAIN - FUNCTIONAL ASSESSMENT: PAIN_FUNCTIONAL_ASSESSMENT: NONE - DENIES PAIN

## 2024-08-12 NOTE — PROGRESS NOTES
Pharmacy Medication Reconciliation Note     List of medications patient is currently taking is complete.    Source of information:   1. Patient  2. Sathya Trial Summary    Notes regarding home medications:   1. Patient reports taking all morning medication doses before arrival today.      Mary Spaulding, Pharmacy Intern  8/12/2024 6:54 PM

## 2024-08-12 NOTE — H&P
MD   losartan (COZAAR) 25 MG tablet Take 3 tablets by mouth in the morning and at bedtime  Patient taking differently: Take 2 tablets by mouth in the morning and at bedtime 8/5/22  Yes Gracia Galvez MD   tiotropium (SPIRIVA RESPIMAT) 1.25 MCG/ACT AERS inhaler Inhale 2 puffs into the lungs daily   Yes Sandra Villalobos MD   acetaminophen (TYLENOL) 500 MG tablet Take 2 tablets by mouth daily   Yes Sandra Villalobos MD   carbidopa-levodopa (SINEMET)  MG per tablet Take 1 tablet by mouth 4 times daily   Yes Sandra Villalobos MD   vitamin D (CHOLECALCIFEROL) 50 MCG (2000 UT) CAPS capsule Take 1 capsule by mouth daily   Yes Sandra Villalobos MD   docusate sodium (COLACE) 100 MG capsule Take 2 capsules by mouth daily   Yes Sandra Villalobos MD   carvedilol (COREG) 3.125 MG tablet Take 1 tablet by mouth 2 times daily (with meals)   Yes Sandra Villalobos MD   escitalopram (LEXAPRO) 10 MG tablet Take 1 tablet by mouth daily   Yes Sandra Villalobos MD   hydrALAZINE (APRESOLINE) 50 MG tablet Take 1 tablet by mouth 3 times daily   Yes Sandra Villalobos MD   levothyroxine (SYNTHROID) 112 MCG tablet Take 1 tablet by mouth Daily   Yes Sandra Villalobos MD   omeprazole (PRILOSEC) 20 MG delayed release capsule Take 1 capsule by mouth daily   Yes Sandra Villalobos MD   Emollient (EUCERIN) lotion Apply topically 2 times daily    Sandra Villalobos MD   acetaminophen (TYLENOL) 325 MG tablet Take 650 mg by mouth every 4 hours as needed for Pain    Sandra Villalobos MD   polyethylene glycol (GLYCOLAX) 17 g packet Take 17 g by mouth daily as needed for Constipation    ProviderSandra MD       Physical Exam:    Physical Exam     General: NAD  Eyes: EOMI  ENT: neck supple  Cardiovascular: Regular rate and rhythm, no murmur noted  Respiratory: Clear to auscultation, bibasilar crackles, no wheeze no rhonchi  Gastrointestinal: Soft, non tender, bowel sound present  Genitourinary: no  is identified within periventricular white matter.  Right basal ganglia fluid is noted.     Unremarkable CTA of the head and neck. Anterior cervical fusion C4-7.     CT HEAD WO CONTRAST    Result Date: 8/12/2024  EXAMINATION: CT OF THE HEAD WITHOUT CONTRAST  8/12/2024 4:26 pm TECHNIQUE: CT of the head was performed without the administration of intravenous contrast. Automated exposure control, iterative reconstruction, and/or weight based adjustment of the mA/kV was utilized to reduce the radiation dose to as low as reasonably achievable. COMPARISON: 06/23/2023. HISTORY: ORDERING SYSTEM PROVIDED HISTORY: transient aphasia, and truncal ataxia TECHNOLOGIST PROVIDED HISTORY: Reason for exam:->transient aphasia, and truncal ataxia Has a \"code stroke\" or \"stroke alert\" been called?->No Decision Support Exception - unselect if not a suspected or confirmed emergency medical condition->Emergency Medical Condition (MA) Reason for Exam: transient aphasia, and truncal ataxia FINDINGS: BRAIN/VENTRICLES: There is no acute intracranial hemorrhage, mass effect or midline shift. No abnormal extra-axial fluid collection.  The gray-white differentiation is maintained without evidence of an acute infarct. There is prominence of the ventricles and sulci due to global parenchymal volume loss. There are nonspecific areas of hypoattenuation within the periventricular and subcortical white matter, which likely represent chronic microvascular ischemic change. ORBITS: The visualized portion of the orbits demonstrate no acute abnormality. SINUSES: The visualized paranasal sinuses and mastoid air cells demonstrate no acute abnormality. SOFT TISSUES/SKULL: No acute abnormality of the visualized skull or soft tissues.     No acute intracranial abnormality.     XR CHEST PORTABLE    Result Date: 8/12/2024  EXAMINATION: ONE XRAY VIEW OF THE CHEST 8/12/2024 4:07 pm COMPARISON: June 23, 2023 HISTORY: ORDERING SYSTEM PROVIDED HISTORY: slurred speeach

## 2024-08-12 NOTE — ED TRIAGE NOTES
Pt to ER from [facility] C/o slurred speech and facial droop per nursing home. Pt LKW was 8/11 @ 0130 with symptom onset of today @ 0730. POA called by nursing home who did not want patient transported to ER. Symptoms resolved and then came back. Pt has 0 complaints. Pt takes 81 mg Aspirin daily. NIH in EMS was 0. .

## 2024-08-12 NOTE — ED PROVIDER NOTES
[facility] EMERGENCY DEPARTMENT      EMERGENCY MEDICINE     Pt Name: Ebonie Hilton  MRN: [id number]  Birthdate 1945  Date of evaluation: 8/12/2024  Provider: Torito Robertson MD    CHIEF COMPLAINT       Chief Complaint   Patient presents with    Transient Ischemic Attack     C/o slurred speech and facial droop per nursing home. Pt LKW was 8/11 @ 0130 with symptom onset of today @ 0730. POA called by nursing home who did not want patient transported to ER. Symptoms resolved and then came back. Pt has 0 complaints. Pt takes 81 mg Aspirin daily. NIH in EMS was 0. .     HISTORY OF PRESENT ILLNESS   Ebonie Hilton is a 79 y.o. female who presents to the emergency department for transient episode of facial droop, dysarthria and aphasia as well as truncal ataxia the nurse, lasted a few minutes.  Then resolved.  She was brought in by EMS.  This occurred at 7:30 AM this morning.  She initially did not want to come in.  But her sister arrived who is also a nurse and advised her to come get evaluated.  Patient denies any recent traumatic injuries or falls.  Denies any current pain.      PASTMEDICAL HISTORY     Past Medical History:   Diagnosis Date    COPD (chronic obstructive pulmonary disease) (HCC)     Hyperlipidemia     Hypertension     Hypothyroid     Neuropathy, peripheral, autonomic, idiopathic     Rhabdomyolysis     S/P insertion of brain-responsive neurostimulation device        Patient Active Problem List   Diagnosis Code    Slurred speech R47.81     SURGICAL HISTORY     History reviewed. No pertinent surgical history.    CURRENT MEDICATIONS       Previous Medications    ACETAMINOPHEN (TYLENOL) 325 MG TABLET    Take 650 mg by mouth every 4 hours as needed for Pain    ACETAMINOPHEN (TYLENOL) 500 MG TABLET    Take 2 tablets by mouth daily    ASPIRIN 81 MG EC TABLET    Take 1 tablet by mouth in the morning.    CARBIDOPA-LEVODOPA (SINEMET)  MG PER TABLET    Take 1 tablet  08/12/24 1551 08/12/24 1559 08/12/24 1619 08/12/24 1629   BP: (!) 110/95 132/70 (!) 141/62 (!) 142/68   Pulse: 61 61 57 59   Resp: 14 12 11 16   Temp: 97.5 °F (36.4 °C)      TempSrc: Oral      SpO2: 95% 95% 94% 97%   Weight: 102.5 kg (225 lb 15.5 oz)      Height: 1.626 m (5' 4\")          The patient was seen and examined.The results of pertinent diagnostic studies and exam findings were discussed. The patient’s provisional diagnosis and plan of care were discussed with the patient  who expressed a complete understanding. Any medications were reviewed and indications and risks of medications were discussed with the patient.     ED Medications administered this visit:  (None if blank)  Medications   cefTRIAXone (ROCEPHIN) 1,000 mg in sterile water 10 mL IV syringe (has no administration in time range)   sodium chloride 0.9 % bolus 1,000 mL (1,000 mLs IntraVENous New Bag 8/12/24 1623)   iopamidol (ISOVUE-370) 76 % injection 75 mL (75 mLs IntraVENous Given 8/12/24 1640)         Responses to treatment:       PROCEDURES: (None if blank)  Procedures:       CRITICAL CARE: (None if blank)  CRITICAL CARE  I personally saw the patient and independently provided 33 minutes of non-concurrent critical care out of the total shared critical care time provided. This excludes seperately billable procedures. Critical care time was provided for TIA that required close evaluation and/or intervention with concern for potential patient decompensation.         DISCHARGE PRESCRIPTIONS: (None if blank)  New Prescriptions    No medications on file         I am the primary clinician of record.     FINAL IMPRESSION      1. TIA (transient ischemic attack)    2. Acute UTI            DISPOSITION/PLAN   DISPOSITION Decision To Admit 08/12/2024 06:37:06 PM      OUTPATIENT FOLLOW UP THE PATIENT:  No follow-up provider specified.      Electronically Signed: Torito Robertson MD, 08/12/24, 6:41 PM    This report has been produced using speech

## 2024-08-13 ENCOUNTER — APPOINTMENT (OUTPATIENT)
Dept: MRI IMAGING | Age: 79
End: 2024-08-13
Payer: MEDICARE

## 2024-08-13 ENCOUNTER — APPOINTMENT (OUTPATIENT)
Age: 79
End: 2024-08-13
Attending: INTERNAL MEDICINE
Payer: MEDICARE

## 2024-08-13 LAB
CHOLEST SERPL-MCNC: 211 MG/DL (ref 0–199)
DEPRECATED RDW RBC AUTO: 14.1 % (ref 12.4–15.4)
ECHO AO ROOT DIAM: 3.1 CM
ECHO AO ROOT INDEX: 1.51 CM/M2
ECHO AV MEAN GRADIENT: 2 MMHG
ECHO AV MEAN VELOCITY: 0.7 M/S
ECHO AV PEAK GRADIENT: 5 MMHG
ECHO AV PEAK VELOCITY: 1.1 M/S
ECHO AV VELOCITY RATIO: 0.82
ECHO AV VTI: 27.1 CM
ECHO BSA: 2.14 M2
ECHO EST RA PRESSURE: 10 MMHG
ECHO IVC PROX: 2.3 CM
ECHO LA AREA 2C: 18.2 CM2
ECHO LA AREA 4C: 17.6 CM2
ECHO LA MAJOR AXIS: 5.8 CM
ECHO LA MINOR AXIS: 5.4 CM
ECHO LA VOL BP: 47 ML (ref 22–52)
ECHO LA VOL MOD A2C: 51 ML (ref 22–52)
ECHO LA VOL MOD A4C: 42 ML (ref 22–52)
ECHO LA VOL/BSA BIPLANE: 23 ML/M2 (ref 16–34)
ECHO LA VOLUME INDEX MOD A2C: 25 ML/M2 (ref 16–34)
ECHO LA VOLUME INDEX MOD A4C: 20 ML/M2 (ref 16–34)
ECHO LV E' LATERAL VELOCITY: 11 CM/S
ECHO LV E' SEPTAL VELOCITY: 7 CM/S
ECHO LV EDV A2C: 83 ML
ECHO LV EDV A4C: 89 ML
ECHO LV EDV INDEX A4C: 43 ML/M2
ECHO LV EDV NDEX A2C: 40 ML/M2
ECHO LV EJECTION FRACTION A2C: 59 %
ECHO LV EJECTION FRACTION A4C: 62 %
ECHO LV EJECTION FRACTION BIPLANE: 62 % (ref 55–100)
ECHO LV ESV A2C: 34 ML
ECHO LV ESV A4C: 34 ML
ECHO LV ESV INDEX A2C: 17 ML/M2
ECHO LV ESV INDEX A4C: 17 ML/M2
ECHO LV FRACTIONAL SHORTENING: 33 % (ref 28–44)
ECHO LV INTERNAL DIMENSION DIASTOLE INDEX: 2.68 CM/M2
ECHO LV INTERNAL DIMENSION DIASTOLIC: 5.5 CM (ref 3.9–5.3)
ECHO LV INTERNAL DIMENSION SYSTOLIC INDEX: 1.8 CM/M2
ECHO LV INTERNAL DIMENSION SYSTOLIC: 3.7 CM
ECHO LV IVSD: 0.8 CM (ref 0.6–0.9)
ECHO LV MASS 2D: 160 G (ref 67–162)
ECHO LV MASS INDEX 2D: 78 G/M2 (ref 43–95)
ECHO LV POSTERIOR WALL DIASTOLIC: 0.8 CM (ref 0.6–0.9)
ECHO LV RELATIVE WALL THICKNESS RATIO: 0.29
ECHO LVOT AREA: 3.1 CM2
ECHO LVOT AV VTI INDEX: 0.87
ECHO LVOT DIAM: 2 CM
ECHO LVOT MEAN GRADIENT: 2 MMHG
ECHO LVOT PEAK GRADIENT: 3 MMHG
ECHO LVOT PEAK VELOCITY: 0.9 M/S
ECHO LVOT STROKE VOLUME INDEX: 36.1 ML/M2
ECHO LVOT SV: 74.1 ML
ECHO LVOT VTI: 23.6 CM
ECHO MV A VELOCITY: 0.84 M/S
ECHO MV AREA VTI: 3 CM2
ECHO MV E DECELERATION TIME (DT): 280 MS
ECHO MV E VELOCITY: 0.55 M/S
ECHO MV E/A RATIO: 0.65
ECHO MV E/E' LATERAL: 5
ECHO MV E/E' RATIO (AVERAGED): 6.43
ECHO MV E/E' SEPTAL: 7.86
ECHO MV LVOT VTI INDEX: 1.04
ECHO MV MAX VELOCITY: 0.7 M/S
ECHO MV MEAN GRADIENT: 1 MMHG
ECHO MV MEAN VELOCITY: 0.4 M/S
ECHO MV PEAK GRADIENT: 2 MMHG
ECHO MV VTI: 24.6 CM
ECHO RA AREA 4C: 15.6 CM2
ECHO RA END SYSTOLIC VOLUME APICAL 4 CHAMBER INDEX BSA: 17 ML/M2
ECHO RA VOLUME: 35 ML
ECHO RIGHT VENTRICULAR SYSTOLIC PRESSURE (RVSP): 15 MMHG
ECHO RV FREE WALL PEAK S': 7 CM/S
ECHO RV TAPSE: 1.9 CM (ref 1.7–?)
ECHO TV REGURGITANT MAX VELOCITY: 1.11 M/S
ECHO TV REGURGITANT PEAK GRADIENT: 5 MMHG
EKG ATRIAL RATE: 60 BPM
EKG DIAGNOSIS: NORMAL
EKG P AXIS: 61 DEGREES
EKG P-R INTERVAL: 176 MS
EKG Q-T INTERVAL: 428 MS
EKG QRS DURATION: 74 MS
EKG QTC CALCULATION (BAZETT): 428 MS
EKG R AXIS: 29 DEGREES
EKG T AXIS: 59 DEGREES
EKG VENTRICULAR RATE: 60 BPM
EST. AVERAGE GLUCOSE BLD GHB EST-MCNC: 108.3 MG/DL
HBA1C MFR BLD: 5.4 %
HCT VFR BLD AUTO: 39.8 % (ref 36–48)
HDLC SERPL-MCNC: 65 MG/DL (ref 40–60)
HGB BLD-MCNC: 13.5 G/DL (ref 12–16)
LDLC SERPL CALC-MCNC: 127 MG/DL
MCH RBC QN AUTO: 30.8 PG (ref 26–34)
MCHC RBC AUTO-ENTMCNC: 34.1 G/DL (ref 31–36)
MCV RBC AUTO: 90.5 FL (ref 80–100)
PLATELET # BLD AUTO: 168 K/UL (ref 135–450)
PMV BLD AUTO: 7.9 FL (ref 5–10.5)
RBC # BLD AUTO: 4.39 M/UL (ref 4–5.2)
TRIGL SERPL-MCNC: 96 MG/DL (ref 0–150)
VLDLC SERPL CALC-MCNC: 19 MG/DL
WBC # BLD AUTO: 5.8 K/UL (ref 4–11)

## 2024-08-13 PROCEDURE — 6370000000 HC RX 637 (ALT 250 FOR IP): Performed by: INTERNAL MEDICINE

## 2024-08-13 PROCEDURE — 97530 THERAPEUTIC ACTIVITIES: CPT | Performed by: PHYSICAL THERAPIST

## 2024-08-13 PROCEDURE — 36415 COLL VENOUS BLD VENIPUNCTURE: CPT

## 2024-08-13 PROCEDURE — 97162 PT EVAL MOD COMPLEX 30 MIN: CPT | Performed by: PHYSICAL THERAPIST

## 2024-08-13 PROCEDURE — 6360000002 HC RX W HCPCS: Performed by: INTERNAL MEDICINE

## 2024-08-13 PROCEDURE — 2060000000 HC ICU INTERMEDIATE R&B

## 2024-08-13 PROCEDURE — 97166 OT EVAL MOD COMPLEX 45 MIN: CPT

## 2024-08-13 PROCEDURE — 93306 TTE W/DOPPLER COMPLETE: CPT | Performed by: INTERNAL MEDICINE

## 2024-08-13 PROCEDURE — 94760 N-INVAS EAR/PLS OXIMETRY 1: CPT

## 2024-08-13 PROCEDURE — 92610 EVALUATE SWALLOWING FUNCTION: CPT

## 2024-08-13 PROCEDURE — 70551 MRI BRAIN STEM W/O DYE: CPT

## 2024-08-13 PROCEDURE — 93010 ELECTROCARDIOGRAM REPORT: CPT | Performed by: INTERNAL MEDICINE

## 2024-08-13 PROCEDURE — 2580000003 HC RX 258: Performed by: INTERNAL MEDICINE

## 2024-08-13 PROCEDURE — 85027 COMPLETE CBC AUTOMATED: CPT

## 2024-08-13 PROCEDURE — 97535 SELF CARE MNGMENT TRAINING: CPT

## 2024-08-13 PROCEDURE — 93306 TTE W/DOPPLER COMPLETE: CPT

## 2024-08-13 PROCEDURE — 97530 THERAPEUTIC ACTIVITIES: CPT

## 2024-08-13 PROCEDURE — 80061 LIPID PANEL: CPT

## 2024-08-13 PROCEDURE — 94640 AIRWAY INHALATION TREATMENT: CPT

## 2024-08-13 PROCEDURE — 6370000000 HC RX 637 (ALT 250 FOR IP): Performed by: HOSPITALIST

## 2024-08-13 PROCEDURE — 83036 HEMOGLOBIN GLYCOSYLATED A1C: CPT

## 2024-08-13 RX ORDER — ASPIRIN 81 MG/1
162 TABLET ORAL DAILY
Status: DISCONTINUED | OUTPATIENT
Start: 2024-08-14 | End: 2024-08-15 | Stop reason: HOSPADM

## 2024-08-13 RX ORDER — ACETAMINOPHEN 325 MG/1
650 TABLET ORAL EVERY 4 HOURS PRN
Status: DISCONTINUED | OUTPATIENT
Start: 2024-08-13 | End: 2024-08-15 | Stop reason: HOSPADM

## 2024-08-13 RX ORDER — ASPIRIN 325 MG
325 TABLET, DELAYED RELEASE (ENTERIC COATED) ORAL DAILY
Status: DISCONTINUED | OUTPATIENT
Start: 2024-08-14 | End: 2024-08-13

## 2024-08-13 RX ORDER — DOCUSATE SODIUM 100 MG/1
200 CAPSULE, LIQUID FILLED ORAL DAILY
Status: DISCONTINUED | OUTPATIENT
Start: 2024-08-13 | End: 2024-08-15 | Stop reason: HOSPADM

## 2024-08-13 RX ORDER — POLYETHYLENE GLYCOL 3350 17 G/17G
17 POWDER, FOR SOLUTION ORAL DAILY PRN
Status: DISCONTINUED | OUTPATIENT
Start: 2024-08-13 | End: 2024-08-15 | Stop reason: HOSPADM

## 2024-08-13 RX ADMIN — PANTOPRAZOLE SODIUM 40 MG: 40 TABLET, DELAYED RELEASE ORAL at 06:04

## 2024-08-13 RX ADMIN — ESCITALOPRAM OXALATE 10 MG: 10 TABLET ORAL at 08:07

## 2024-08-13 RX ADMIN — CARBIDOPA AND LEVODOPA 1 TABLET: 25; 100 TABLET ORAL at 08:07

## 2024-08-13 RX ADMIN — CARBIDOPA AND LEVODOPA 1 TABLET: 25; 100 TABLET ORAL at 18:24

## 2024-08-13 RX ADMIN — CARBIDOPA AND LEVODOPA 1 TABLET: 25; 100 TABLET ORAL at 12:29

## 2024-08-13 RX ADMIN — Medication 2000 UNITS: at 08:08

## 2024-08-13 RX ADMIN — LOSARTAN POTASSIUM 75 MG: 50 TABLET, FILM COATED ORAL at 08:07

## 2024-08-13 RX ADMIN — ENOXAPARIN SODIUM 30 MG: 100 INJECTION SUBCUTANEOUS at 20:53

## 2024-08-13 RX ADMIN — SODIUM CHLORIDE, PRESERVATIVE FREE 10 ML: 5 INJECTION INTRAVENOUS at 21:00

## 2024-08-13 RX ADMIN — TIOTROPIUM BROMIDE INHALATION SPRAY 1 PUFF: 3.12 SPRAY, METERED RESPIRATORY (INHALATION) at 09:30

## 2024-08-13 RX ADMIN — CARVEDILOL 3.12 MG: 3.12 TABLET, FILM COATED ORAL at 08:07

## 2024-08-13 RX ADMIN — ENOXAPARIN SODIUM 30 MG: 100 INJECTION SUBCUTANEOUS at 08:08

## 2024-08-13 RX ADMIN — HYDRALAZINE HYDROCHLORIDE 50 MG: 50 TABLET ORAL at 15:30

## 2024-08-13 RX ADMIN — CARVEDILOL 3.12 MG: 3.12 TABLET, FILM COATED ORAL at 18:24

## 2024-08-13 RX ADMIN — SODIUM CHLORIDE, PRESERVATIVE FREE 10 ML: 5 INJECTION INTRAVENOUS at 08:13

## 2024-08-13 RX ADMIN — LOSARTAN POTASSIUM 75 MG: 50 TABLET, FILM COATED ORAL at 20:52

## 2024-08-13 RX ADMIN — CARBIDOPA AND LEVODOPA 1 TABLET: 25; 100 TABLET ORAL at 20:53

## 2024-08-13 RX ADMIN — DOCUSATE SODIUM 200 MG: 100 CAPSULE, LIQUID FILLED ORAL at 12:29

## 2024-08-13 RX ADMIN — LEVOTHYROXINE SODIUM 112 MCG: 0.11 TABLET ORAL at 06:04

## 2024-08-13 RX ADMIN — HYDRALAZINE HYDROCHLORIDE 50 MG: 50 TABLET ORAL at 20:52

## 2024-08-13 RX ADMIN — HYDRALAZINE HYDROCHLORIDE 50 MG: 50 TABLET ORAL at 08:07

## 2024-08-13 RX ADMIN — ASPIRIN 81 MG: 81 TABLET, COATED ORAL at 08:07

## 2024-08-13 NOTE — PROGRESS NOTES
Pt needs the remote to her spinal implant to receive MRI. This RN reached out to family friend Annita to see if she could get it from the nursing home, no answer. This RN left voicemail with call back number.

## 2024-08-13 NOTE — PROGRESS NOTES
4 Eyes Skin Assessment     NAME:  Ebonie Hilton  YOB: 1945  MEDICAL RECORD NUMBER:  [id number]    The patient is being assessed for  Admission    I agree that at least one RN has performed a thorough Head to Toe Skin Assessment on the patient. ALL assessment sites listed below have been assessed.      Areas assessed by both nurses:    Head, Face, Ears, Shoulders, Back, Chest, Arms, Elbows, Hands, Sacrum. Buttock, Coccyx, Ischium, Legs. Feet and Heels, and Under Medical Devices         Does the Patient have a Wound? No noted wound(s)       Timmy Prevention initiated by RN: Yes  Wound Care Orders initiated by RN: No    Pressure Injury (Stage 3,4, Unstageable, DTI, NWPT, and Complex wounds) if present, place Wound referral order by RN under : No    New Ostomies, if present place, Ostomy referral order under : No     Nurse 1 eSignature: Electronically signed by Phong Martino RN on 8/12/24 at 11:03 PM EDT    **SHARE this note so that the co-signing nurse can place an eSignature**    Nurse 2 eSignature: Electronically signed by Anneliese Norris RN on 8/13/24 at 6:19 AM EDT

## 2024-08-13 NOTE — CARE COORDINATION
Case Management Assessment  Initial Evaluation    Date/Time of Evaluation: 8/13/2024 3:56 PM  Assessment Completed by: VIC Chaudhari    If patient is discharged prior to next notation, then this note serves as note for discharge by case management.    Patient Name: Ebonie Hilton                   YOB: 1945  Diagnosis: TIA (transient ischemic attack) [G45.9]  Acute UTI [N39.0]  Transient ischemia [I99.8]  Cerebrovascular accident (CVA), unspecified mechanism (HCC) [I63.9]                   Date / Time: 8/12/2024  3:45 PM    Patient Admission Status: Inpatient   Readmission Risk (Low < 19, Mod (19-27), High > 27): Readmission Risk Score: 15.4    Current PCP: Kenneth Hernandez MD  PCP verified by CM? Yes    Chart Reviewed: Yes      History Provided by: Patient  Patient Orientation: Alert and Oriented    Patient Cognition: Alert    Hospitalization in the last 30 days (Readmission):  No    If yes, Readmission Assessment in CM Navigator will be completed.    Advance Directives:      Code Status: Full Code   Patient's Primary Decision Maker is: Named in Scanned ACP Document    Primary Decision Maker: Holley Pop - Niece/Nephew - [phone]    Secondary Decision Maker: Annita Guzman - Miroslava - [phone]    Discharge Planning:    Patient lives with: Other (Comment) ([facility]) Type of Home: Skilled Nursing Facility  Primary Care Giver: Self  Patient Support Systems include: Children, Family Members, Friends/Neighbors   Current Financial resources: Medicare  Current community resources: ECF/Home Care (Return to [facility])  Current services prior to admission: (P) Skilled Nursing Facility, Durable Medical Equipment            Current DME: (P) Walker, Wheelchair, Other (Comment) (motorized wheelchair)            Type of Home Care services:  OT, PT, Aide Services, Skilled Therapy, Housekeeping    ADLS  Prior functional level: Assistance with the following:, Housework, Shopping,

## 2024-08-13 NOTE — PROGRESS NOTES
[facility]: WST[facility] 5W PROGRESSIVE CARE  Initial Assessment  DYSPHAGIA BEDSIDE SWALLOW EVALUATION     Patient: Ebonie Hilton   : 1945   MRN: [id number]      Evaluation Date: 2024   Admitting Diagnosis:   TIA (transient ischemic attack) [G45.9]  Acute UTI [N39.0]  Transient ischemia [I99.8]  Cerebrovascular accident (CVA), unspecified mechanism (HCC) [I63.9]   has a past medical history of COPD (chronic obstructive pulmonary disease) ([facility]), Hyperlipidemia, Hypertension, Hypothyroid, Neuropathy, peripheral, autonomic, idiopathic, Rhabdomyolysis, and S/P insertion of brain-responsive neurostimulation device.   has no past surgical history on file.  Allergies: medication allergies noted  Dysphagia History including instrumental assessment: Pt previously evaluated by SLP in 2022 recommended for regular diet with thin liquids.    Any GI or ENT history: None per EMR                                             Onset date: 2024     Chart Review:   H&P: Per H&P \"Ebonie Hilton is a 79 y.o. female with a pmh of hypertension, hypothyroid, Parkinson's resident of nursing home who presents with Transient ischemia \"    Current Consultations this admit: Neurology    Imaging:  Chest X-ray: 24 \"IMPRESSION: No acute process.\"    Head CT: 24 \"IMPRESSION: No acute intracranial abnormality.\"    Reason for referral: SLP evaluation orders received due to CVA protocol     Current Diet Level (prior to evaluation): Regular texture, Thin liquids    Respiratory Status: Room Air     Baseline History/Prior Level of Function:   Living Status: Pt resides in [facility]  Baseline diet: regular/thin  Prior Dysphagia History: as above.    Dysphagia Impressions/Diagnosis: Oropharyngeal Dysphagia   Pre-feeding assessment: Upon SLP arrival, pt upright in chair eating breakfast. Agreeable to SLP evaluation. Flat affect noted. Oral motor evaluation unremarkable, adequate dentition.   Clinically, pt presents with WFL oral and questioned

## 2024-08-13 NOTE — PLAN OF CARE
Problem: Discharge Planning  Goal: Discharge to home or other facility with appropriate resources  Outcome: Progressing  Flowsheets (Taken 8/12/2024 2110)  Discharge to home or other facility with appropriate resources:   Identify barriers to discharge with patient and caregiver   Arrange for needed discharge resources and transportation as appropriate   Identify discharge learning needs (meds, wound care, etc)   Refer to discharge planning if patient needs post-hospital services based on physician order or complex needs related to functional status, cognitive ability or social support system     Problem: Skin/Tissue Integrity  Goal: Absence of new skin breakdown  Description: 1.  Monitor for areas of redness and/or skin breakdown  2.  Assess vascular access sites hourly  3.  Every 4-6 hours minimum:  Change oxygen saturation probe site  4.  Every 4-6 hours:  If on nasal continuous positive airway pressure, respiratory therapy assess nares and determine need for appliance change or resting period.  Outcome: Progressing     Problem: Safety - Adult  Goal: Free from fall injury  Outcome: Progressing     Problem: ABCDS Injury Assessment  Goal: Absence of physical injury  Outcome: Progressing

## 2024-08-13 NOTE — PROGRESS NOTES
Nini renteria [facility] is here in the pt's room charging and turning off the pt's spinal implant stimulator. MRI and MD aware.

## 2024-08-13 NOTE — CONSULTS
Neurology Consult Note  Reason for Consult: TIA    Chief complaint: \"They said my face was drooping\"    Dr Martin, Armando Brice MD asked me to see Ebonie Hilton in consultation for evaluation of TIA    History of Present Illness:  I obtained my information via the patient, supplemented with chart review.     Ebonie Hilton is a 79 y.o. female with hx of HTN, HLD, neuro spinal stimulator, neuropathy, COPD, see below for additional. She was admitted on 8/12/24 for evaluation from her nursing facility for evaluation of new onset left facial droop, dysarthria, aphasia, and truncal ataxia. Symptoms lasted a few minutes and resolved. Patient denies awareness of symptoms without impaired recall events.     She currently at time of encounter feels at her baseline. Denies any headache, new or worsening weakness or sensory symptoms. No vision changes. No recent illness.     She reports hx of TIA vs. Stroke in the past. She was seen by neurology 8/4/2022 with symptoms of transient slurred speech and facial droop. MRI brain was negative for acute stroke at that time.     BP on arrival was 110/95. CTA head & neck was without severe stenosis or LVO. CT head without acute process. She is pending MRI.         Medical History:  Past Medical History:   Diagnosis Date    COPD (chronic obstructive pulmonary disease) (HCC)     Hyperlipidemia     Hypertension     Hypothyroid     Neuropathy, peripheral, autonomic, idiopathic     Rhabdomyolysis     S/P insertion of brain-responsive neurostimulation device      History reviewed. No pertinent surgical history.  Scheduled Meds:   docusate sodium  200 mg Oral Daily    sodium chloride flush  5-40 mL IntraVENous 2 times per day    enoxaparin  30 mg SubCUTAneous BID    aspirin  81 mg Oral Daily    carbidopa-levodopa  1 tablet Oral 4x Daily    carvedilol  3.125 mg Oral BID WC    escitalopram  10 mg Oral Daily    hydrALAZINE  50 mg Oral TID    levothyroxine  112 mcg Oral Daily    losartan  75 mg

## 2024-08-13 NOTE — PROGRESS NOTES
V2.0    [facility] Progress Note      Name:  Ebonie Hilton /Age/Sex: 1945  (79 y.o. female)   MRN & CSN:  [id number] & [id number] Encounter Date/Time: 2024 8:57 AM EDT   Location:  G1Y-1535/5107-01 PCP: Kenneth Hernandez MD     Attending:Armando Martin MD       Hospital Day: 2      HPI :   Chief Complaint:     Ebonie Hilton is a 79 y.o. female who presents with stroke like symtpoms      Subjective:     Feels ok ansd wants to go home    Review of Systems:      Pertinent positives and negatives discussed in HPI    Objective:     Intake/Output Summary (Last 24 hours) at 2024 0839  Last data filed at 2024 0249  Gross per 24 hour   Intake --   Output 1900 ml   Net -1900 ml      Vitals:   Vitals:    24 2335 24 0129 24 0230 24 0726   BP: (!) 159/106  119/62 (!) 155/85   Pulse: 87      Resp: 20      Temp: 98 °F (36.7 °C)  98.2 °F (36.8 °C) 97.4 °F (36.3 °C)   TempSrc: Oral  Oral Oral   SpO2: 93%  95% 95%   Weight: 101.7 kg (224 lb 3.3 oz) 101.7 kg (224 lb 3.3 oz)     Height:             Physical Exam:      Physical Exam Performed:    BP (!) 155/85   Pulse 87   Temp 97.4 °F (36.3 °C) (Oral)   Resp 20   Ht 1.626 m (5' 4\")   Wt 101.7 kg (224 lb 3.3 oz)   SpO2 95%   BMI 38.49 kg/m²     General appearance: No apparent distress, appears stated age and cooperative.  HEENT: Pupils equal, round, and reactive to light. Conjunctivae/corneas clear.  Neck: Supple, with full range of motion. No jugular venous distention. Trachea midline.  Respiratory:  Normal respiratory effort. Clear to auscultation, bilaterally without Rales/Wheezes/Rhonchi.  Cardiovascular: Regular rate and rhythm with normal S1/S2 without murmurs, rubs or gallops.  Abdomen: Soft, non-tender, non-distended with normal bowel sounds.  Musculoskeletal: No clubbing, cyanosis or edema bilaterally.  Full range of motion without deformity.  Neurologic:  Neurovascularly intact without any focal sensory/motor deficits.  similar compared to the prior exam. Organs: The liver, pancreas, spleen, kidneys, and adrenals reveal no acute findings. No inflammatory change identified in the gallbladder fossa. GI/Bowel: Liquid stool is present throughout the colon.  The sigmoid colon takes an unusual course towards the right abdomen and there is mild swirling of the mesentery noted without twisting to suggest volvulus.  Loops of colon are mildly distended with gas.  No significant small bowel distension is appreciated.  No wall thickening or inflammatory change.  No pneumatosis or portal venous gas. Pelvis: Mild diffuse bladder wall thickening. Peritoneum/Retroperitoneum: No free air or free fluid.  The aorta is normal in caliber.  The visceral branches are patent. Calcified atheromatous plaque is present.  No lymphadenopathy. Bones/Soft Tissues: Small fat containing umbilical hernia.  Severe compression deformity of L2 is again noted.     1.  Findings compatible diarrheal process.  Mild colonic distension is noted with mild mesenteric swirling but no evidence for volvulus.  This may be due to a partial obstructing process due to an underlying internal hernia.  No significant small bowel distension. 2.  Additional chronic and benign findings, as described.       CBC:   Recent Labs     08/12/24  1554 08/13/24  0437   WBC 6.3 5.8   HGB 13.7 13.5    168     BMP:    Recent Labs     08/12/24  1554      K 4.5      CO2 24   BUN 25*   CREATININE 1.0   GLUCOSE 147*     Hepatic:   Recent Labs     08/12/24  1554   AST 15   ALT <5*   BILITOT 0.4   ALKPHOS 103     Lipids:   Lab Results   Component Value Date/Time    CHOL 211 08/13/2024 04:37 AM    HDL 65 08/13/2024 04:37 AM    TRIG 96 08/13/2024 04:37 AM     Hemoglobin A1C:   Lab Results   Component Value Date/Time    LABA1C 5.4 08/13/2024 04:37 AM     TSH: No results found for: \"TSH\"  Troponin: No results found for: \"TROPONINT\"  Lactic Acid: No results for input(s): \"LACTA\" in the last

## 2024-08-13 NOTE — ED NOTES
Patient to be transported to Room 5107 via stretcher by transport tech.Patient transported with bedside cardiac monitor. IV site clean, dry, and intact. Updated patient and family on plan of care.

## 2024-08-13 NOTE — PROGRESS NOTES
Pt arrived to room 5107 from ED via stretcher. Telemetry activated, sinus on monitor. CMU verified. Pt A&O x4, VSS. Assessment completed and history obtained. Pt oriented to room and unit. Orders reviewed, medications verified, all questions answered. Pt resting comfortably in bed, voices no complaints at this time. Bed in lowest position with wheels locked and bed alarm on. Call light within reach. Care ongoing.

## 2024-08-13 NOTE — PROGRESS NOTES
Physical Therapy  Facility/Department: [address] PROGRESSIVE CARE  Physical Therapy Initial Assessment    Name: Ebonie Hilton  : 1945  MRN: [id number]  Date of Service: 2024    Discharge Recommendations:  Subacute/Skilled Nursing Facility   PT Equipment Recommendations  Equipment Needed: No     Ebonie Hilton scored a 10/24 on the AM-PAC short mobility form. Current research shows that an AM-PAC score of 17 or less is typically not associated with a discharge to the patient's home setting. Based on the patient's AM-PAC score and their current functional mobility deficits, it is recommended that the patient have 3-5 sessions per week of Physical Therapy at d/c to increase the patient's independence.  Please see assessment section for further patient specific details.    If patient discharges prior to next session this note will serve as a discharge summary.  Please see below for the latest assessment towards goals.       Patient Diagnosis(es): The primary encounter diagnosis was TIA (transient ischemic attack). Diagnoses of Acute UTI and Cerebrovascular accident (CVA), unspecified mechanism (HCC) were also pertinent to this visit.  Past Medical History:  has a past medical history of COPD (chronic obstructive pulmonary disease) (HCC), Hyperlipidemia, Hypertension, Hypothyroid, Neuropathy, peripheral, autonomic, idiopathic, Rhabdomyolysis, and S/P insertion of brain-responsive neurostimulation device.  Past Surgical History:  has no past surgical history on file.    Assessment   Assessment: pt is a 80 yo female who was adm to hosp from her AL facility for concerns of TIA/CVA due to slurred speech and aphasia.  Pt at baseline is Ind with a rollator for mobility tasks but has assist for bathing.  Pt has had several falls in past few months and remains an elevated fall risk.  Pt will benefit from continued therapy in SNF at [facility] to address deficits to assist pt in regaining her Ind  Therapy  Exceptions  Arousal/Alertness: Appears intact  Following Commands: Follows one step commands with increased time  Attention Span: Attends with cues to redirect  Memory: Decreased recall of recent events  Safety Judgement: Decreased awareness of need for safety;Decreased awareness of need for assistance  Problem Solving: Impaired  Insights: Decreased awareness of deficits  Initiation: Requires cues for some     Objective     AROM RLE (degrees)  RLE AROM: WFL  AROM LLE (degrees)  LLE AROM : WFL  Strength RLE  Strength RLE: WFL  Strength LLE  Strength LLE: WFL           Bed mobility  Sit to Supine: Minimal assistance;Moderate assistance (HOB elevated)  Bed Mobility Comments: up in chair at end of session  Transfers  Sit to Stand: Minimal Assistance;Moderate Assistance;2 Person Assistance (from EOB to walker; min A with stedy)  Stand to Sit: Minimal Assistance;Moderate Assistance (from RW to EOB; min A from stedy to chair and to toilet)  Bed to Chair: Dependent/Total (with stedy)  Comment: pt dizzy with standing; /101; deferred gait attempts due to unsteadiness and pt feeling dizzy        Balance  Comments: CGA/min A for sitting EOB and standing on stedy       Pt used commode with assist to change adult brief and cleanse pt; pt left in chair with LEs elevated and all needs in reach; set up for breakfast tray        AM-PAC - Mobility    AM-PAC Basic Mobility - Inpatient   How much help is needed turning from your back to your side while in a flat bed without using bedrails?: A Little  How much help is needed moving from lying on your back to sitting on the side of a flat bed without using bedrails?: A Lot  How much help is needed moving to and from a bed to a chair?: Total  How much help is needed standing up from a chair using your arms?: A Lot  How much help is needed walking in hospital room?: Total  How much help is needed climbing 3-5 steps with a railing?: Total  AM-PAC Inpatient Mobility Raw Score :  10  AM-PAC Inpatient T-Scale Score : 32.29  Mobility Inpatient CMS 0-100% Score: 76.75  Mobility Inpatient CMS G-Code Modifier : CL       Goals  Short Term Goals  Time Frame for Short Term Goals: by discharge  Short Term Goal 1: bed mob SBA  Short Term Goal 2: transfers CGA  Short Term Goal 3: amb 100' with RW CGA  Patient Goals   Patient Goals : to go back to her AL apt       Education  Patient Education  Education Given To: Patient  Education Provided: Role of Therapy  Education Provided Comments: reviewed call light and not getting up without assist  Education Method: Verbal  Education Outcome: Verbalized understanding;Continued education needed      Therapy Time   Individual Concurrent Group Co-treatment   Time In 0820         Time Out 0918         Minutes 58                 MACK ROMAN PT   Electronically signed by MACK ROMAN PT on 8/13/2024 at 9:19 AM

## 2024-08-13 NOTE — PROGRESS NOTES
Occupational Therapy  Facility/Department: [address] PROGRESSIVE CARE  Occupational Therapy Initial Assessment    Name: Ebonie Hilton  : 1945  MRN: [id number]  Date of Service: 2024    Discharge Recommendations:  Patient would benefit from continued therapy after discharge, 3-5 sessions per week  OT Equipment Recommendations  Other: defer to [place] facility     Ebonie Hilton scored a 16/24 on the AM-PAC ADL Inpatient form. Current research shows that an AM-PAC score of 17 or less is typically not associated with a discharge to the patient's home setting. Based on the patient's AM-PAC score and their current ADL deficits, it is recommended that the patient have 3-5 sessions per week of Occupational Therapy at d/c to increase the patient's independence.  Please see assessment section for further patient specific details.    If patient discharges prior to next session this note will serve as a discharge summary.  Please see below for the latest assessment towards goals.      Patient Diagnosis(es): The primary encounter diagnosis was TIA (transient ischemic attack). Diagnoses of Acute UTI and Cerebrovascular accident (CVA), unspecified mechanism (HCC) were also pertinent to this visit.  Past Medical History:  has a past medical history of COPD (chronic obstructive pulmonary disease) (HCC), Hyperlipidemia, Hypertension, Hypothyroid, Neuropathy, peripheral, autonomic, idiopathic, Rhabdomyolysis, and S/P insertion of brain-responsive neurostimulation device.  Past Surgical History:  has no past surgical history on file.           Assessment   Performance deficits / Impairments: Decreased functional mobility ;Decreased safe awareness;Decreased balance;Decreased ADL status;Decreased cognition;Decreased endurance;Decreased high-level IADLs  Assessment: 78 y/o female admitted 2024 with CVA r/o. PTA pt from Assisted Living at [facility]. Pt reports staff assist with showers/dressing and pt ind with toileting  limits    Cognition  Overall Cognitive Status: Exceptions  Arousal/Alertness: Appears intact  Following Commands: Follows one step commands with increased time  Attention Span: Attends with cues to redirect  Memory: Decreased recall of recent events  Safety Judgement: Decreased awareness of need for safety;Decreased awareness of need for assistance  Problem Solving: Impaired  Insights: Decreased awareness of deficits  Initiation: Requires cues for some  Cognition Comment: flat affect, delayed processing  Orientation  Overall Orientation Status: Within Functional Limits  Orientation Level:  (partial situation)               Static Sitting Balance Exercises: Sitting EOB with CGA/min A d/t posterior lean  Static Standing Balance Exercises: standing to RW with min/mod A for static standing balance d/t posterior lean; slightly improved to CGA with use of stedy lift for balance    Education Given To: Patient  Education Provided: Role of Therapy;Plan of Care;Transfer Training  Education Method: Verbal;Demonstration  Barriers to Learning: Cognition  Education Outcome: Verbalized understanding;Demonstrated understanding;Continued education needed    LUE AROM (degrees)  LUE AROM : WFL  Left Hand AROM (degrees)  Left Hand AROM: WFL  RUE AROM (degrees)  RUE AROM : WFL  Right Hand AROM (degrees)  Right Hand AROM: WFL         AM-PAC - ADL  AM-PAC Daily Activity - Inpatient   How much help is needed for putting on and taking off regular lower body clothing?: A Lot  How much help is needed for bathing (which includes washing, rinsing, drying)?: A Lot  How much help is needed for toileting (which includes using toilet, bedpan, or urinal)?: A Lot  How much help is needed for putting on and taking off regular upper body clothing?: A Little  How much help is needed for taking care of personal grooming?: A Little  How much help for eating meals?: None  AM-[facility] Inpatient Daily Activity Raw Score: 16  AM-PAC Inpatient ADL T-Scale Score :

## 2024-08-13 NOTE — PROGRESS NOTES
RN spoke with family friend Annita @[phone] about pt's spinal implant remote and could not find it at the facility. Pt's niece reached out to RN @1211 to inform that she called the brand ([employer]) rep Baltazar Skinner and left voicemail with RN's call back number.             @1320 RN received a called from Baltazar Skinner and he stated that some one (him or a co worker) will come to the hospital to handle the implant so pt can receive MRI. MD aware.

## 2024-08-13 NOTE — ACP (ADVANCE CARE PLANNING)
Advance Care Planning     Advance Care Planning Activator (Inpatient)  Conversation Note      Date of ACP Conversation: 8/13/2024     Conversation Conducted with: Patient with Decision Making Capacity    ACP Activator: VIC Chaudhari      Health Care Decision Maker:     Current Designated Health Care Decision Maker:     Primary Decision Maker: Holley Pop - Niece/Nephew - [phone]    Secondary Decision Maker: Annita Guzman - Neighbor - [phone]    Today we documented Decision Maker(s) consistent with ACP documents on file.    Care Preferences    Ventilation:  \"If you were in your present state of health and suddenly became very ill and were unable to breathe on your own, what would your preference be about the use of a ventilator (breathing machine) if it were available to you?\"      Would the patient desire the use of ventilator (breathing machine)?: n/a    \"If your health worsens and it becomes clear that your chance of recovery is unlikely, what would your preference be about the use of a ventilator (breathing machine) if it were available to you?\"     Would the patient desire the use of ventilator (breathing machine)?: No      Resuscitation  \"CPR works best to restart the heart when there is a sudden event, like a heart attack, in someone who is otherwise healthy. Unfortunately, CPR does not typically restart the heart for people who have serious health conditions or who are very sick.\"    \"In the event your heart stopped as a result of an underlying serious health condition, would you want attempts to be made to restart your heart (answer \"yes\" for attempt to resuscitate) or would you prefer a natural death (answer \"no\" for do not attempt to resuscitate)?\" n/a       [] Yes   [x] No   Educated Patient / Decision Maker regarding differences between Advance Directives and portable DNR orders.    Length of ACP Conversation in minutes: 3      Conversation Outcomes:  Existing advance directive  reviewed with patient; no changes to patient's previously recorded wishes    Follow-up plan:    [] Schedule follow-up conversation to continue planning  [] Referred individual to Provider for additional questions/concerns   [] Advised patient/agent/surrogate to review completed ACP document and update if needed with changes in condition, patient preferences or care setting    [] This note routed to one or more involved healthcare providers       Electronically signed by VIC Chaudhari on 8/13/2024 at 4:04 PM

## 2024-08-13 NOTE — PLAN OF CARE
Problem: Discharge Planning  Goal: Discharge to home or other facility with appropriate resources  8/13/2024 1320 by Samara Hopkins, RN  Outcome: Progressing  8/13/2024 0125 by Phong Martino, RN  Outcome: Progressing  Flowsheets (Taken 8/12/2024 2110)  Discharge to home or other facility with appropriate resources:   Identify barriers to discharge with patient and caregiver   Arrange for needed discharge resources and transportation as appropriate   Identify discharge learning needs (meds, wound care, etc)   Refer to discharge planning if patient needs post-hospital services based on physician order or complex needs related to functional status, cognitive ability or social support system     Problem: Skin/Tissue Integrity  Goal: Absence of new skin breakdown  Description: 1.  Monitor for areas of redness and/or skin breakdown  2.  Assess vascular access sites hourly  3.  Every 4-6 hours minimum:  Change oxygen saturation probe site  4.  Every 4-6 hours:  If on nasal continuous positive airway pressure, respiratory therapy assess nares and determine need for appliance change or resting period.  8/13/2024 1320 by Samara Hopkins, RN  Outcome: Progressing  8/13/2024 0125 by Phong Martino, RN  Outcome: Progressing     Problem: Safety - Adult  Goal: Free from fall injury  8/13/2024 1320 by Samara Hopkins, RN  Outcome: Progressing  8/13/2024 0125 by Phong Martino, RN  Outcome: Progressing

## 2024-08-14 PROCEDURE — 97530 THERAPEUTIC ACTIVITIES: CPT | Performed by: PHYSICAL THERAPIST

## 2024-08-14 PROCEDURE — 97129 THER IVNTJ 1ST 15 MIN: CPT

## 2024-08-14 PROCEDURE — 2580000003 HC RX 258: Performed by: INTERNAL MEDICINE

## 2024-08-14 PROCEDURE — 97530 THERAPEUTIC ACTIVITIES: CPT

## 2024-08-14 PROCEDURE — 97116 GAIT TRAINING THERAPY: CPT | Performed by: PHYSICAL THERAPIST

## 2024-08-14 PROCEDURE — 2060000000 HC ICU INTERMEDIATE R&B

## 2024-08-14 PROCEDURE — 97535 SELF CARE MNGMENT TRAINING: CPT

## 2024-08-14 PROCEDURE — 92526 ORAL FUNCTION THERAPY: CPT

## 2024-08-14 PROCEDURE — 94640 AIRWAY INHALATION TREATMENT: CPT

## 2024-08-14 PROCEDURE — 6360000002 HC RX W HCPCS: Performed by: INTERNAL MEDICINE

## 2024-08-14 PROCEDURE — 6370000000 HC RX 637 (ALT 250 FOR IP): Performed by: HOSPITALIST

## 2024-08-14 PROCEDURE — 6370000000 HC RX 637 (ALT 250 FOR IP): Performed by: INTERNAL MEDICINE

## 2024-08-14 PROCEDURE — 6370000000 HC RX 637 (ALT 250 FOR IP): Performed by: STUDENT IN AN ORGANIZED HEALTH CARE EDUCATION/TRAINING PROGRAM

## 2024-08-14 PROCEDURE — 94760 N-INVAS EAR/PLS OXIMETRY 1: CPT

## 2024-08-14 RX ADMIN — Medication 2000 UNITS: at 08:16

## 2024-08-14 RX ADMIN — CARBIDOPA AND LEVODOPA 1 TABLET: 25; 100 TABLET ORAL at 18:33

## 2024-08-14 RX ADMIN — ENOXAPARIN SODIUM 30 MG: 100 INJECTION SUBCUTANEOUS at 08:17

## 2024-08-14 RX ADMIN — ENOXAPARIN SODIUM 30 MG: 100 INJECTION SUBCUTANEOUS at 20:22

## 2024-08-14 RX ADMIN — ASPIRIN 162 MG: 81 TABLET, COATED ORAL at 08:15

## 2024-08-14 RX ADMIN — CARBIDOPA AND LEVODOPA 1 TABLET: 25; 100 TABLET ORAL at 08:16

## 2024-08-14 RX ADMIN — DOCUSATE SODIUM 200 MG: 100 CAPSULE, LIQUID FILLED ORAL at 08:16

## 2024-08-14 RX ADMIN — HYDRALAZINE HYDROCHLORIDE 50 MG: 50 TABLET ORAL at 20:17

## 2024-08-14 RX ADMIN — CARVEDILOL 3.12 MG: 3.12 TABLET, FILM COATED ORAL at 18:33

## 2024-08-14 RX ADMIN — SODIUM CHLORIDE, PRESERVATIVE FREE 10 ML: 5 INJECTION INTRAVENOUS at 20:27

## 2024-08-14 RX ADMIN — HYDRALAZINE HYDROCHLORIDE 50 MG: 50 TABLET ORAL at 13:28

## 2024-08-14 RX ADMIN — LEVOTHYROXINE SODIUM 112 MCG: 0.11 TABLET ORAL at 06:11

## 2024-08-14 RX ADMIN — LOSARTAN POTASSIUM 75 MG: 50 TABLET, FILM COATED ORAL at 20:18

## 2024-08-14 RX ADMIN — ESCITALOPRAM OXALATE 10 MG: 10 TABLET ORAL at 08:16

## 2024-08-14 RX ADMIN — CARVEDILOL 3.12 MG: 3.12 TABLET, FILM COATED ORAL at 08:16

## 2024-08-14 RX ADMIN — LOSARTAN POTASSIUM 75 MG: 50 TABLET, FILM COATED ORAL at 08:16

## 2024-08-14 RX ADMIN — TIOTROPIUM BROMIDE INHALATION SPRAY 1 PUFF: 3.12 SPRAY, METERED RESPIRATORY (INHALATION) at 08:41

## 2024-08-14 RX ADMIN — HYDRALAZINE HYDROCHLORIDE 50 MG: 50 TABLET ORAL at 08:16

## 2024-08-14 RX ADMIN — CARBIDOPA AND LEVODOPA 1 TABLET: 25; 100 TABLET ORAL at 20:17

## 2024-08-14 RX ADMIN — CARBIDOPA AND LEVODOPA 1 TABLET: 25; 100 TABLET ORAL at 13:28

## 2024-08-14 RX ADMIN — PANTOPRAZOLE SODIUM 40 MG: 40 TABLET, DELAYED RELEASE ORAL at 06:11

## 2024-08-14 RX ADMIN — SODIUM CHLORIDE, PRESERVATIVE FREE 10 ML: 5 INJECTION INTRAVENOUS at 08:20

## 2024-08-14 NOTE — PROGRESS NOTES
Patient's POA called wanting an update on the patient and the results of the MRI. POA would like the doctor to call her today to go over the results of the MRI. POA is Holley, [phone].    POA would also like for the emergency room notes that state  \"POA called by nursing home who did not want patient transported to ER,\" POA stated that this information is not correct and that she always encourages the pt to seek medical attention if and when it is needed.    Electronically signed by Phong Martino RN on 8/14/2024 at 3:40 AM

## 2024-08-14 NOTE — PROGRESS NOTES
[facility]  SLP DYSPHAGIA THERAPY    Patient: Ebonie Hilton   : 1945   MRN: [id number]    Treatment Date: 2024   Admitting Diagnosis:   TIA (transient ischemic attack) [G45.9]  Acute UTI [N39.0]  Transient ischemia [I99.8]  Cerebrovascular accident (CVA), unspecified mechanism (HCC) [I63.9]   has a past medical history of COPD (chronic obstructive pulmonary disease) ([facility]), Hyperlipidemia, Hypertension, Hypothyroid, Neuropathy, peripheral, autonomic, idiopathic, Rhabdomyolysis, and S/P insertion of brain-responsive neurostimulation device.   has no past surgical history on file.  Allergies: medication allergies noted, nickel  Dysphagia History including instrumental assessment: Pt previously evaluated by SLP in 2022 recommended for regular diet with thin liquids.    Any GI or ENT history: None per EMR  Baseline History/Prior Level of Function:   Living Status: Pt resides in [facility]  Baseline diet: regular/thin    Onset: 2024     Chart Review:   Admission H&P: Per H&P \"Ebonie Hilton is a 79 y.o. female with a pmh of hypertension, hypothyroid, Parkinson's resident of nursing home who presents with Transient ischemia \"  Current Consultations this admit: Neurology  2024 admitted with stroke-like symptoms  2024 swallow eval completed (only orders for swallow eval/tx received)     Imaging:  Chest X-ray: 24 \"IMPRESSION: No acute process.\"    Head CT: 24 \"IMPRESSION: No acute intracranial abnormality.\"    Brain MRI: 2024 \"IMPRESSION: Acute to subacute infarction in the right basal ganglia. Mild to moderate ventriculomegaly, disproportionate to the degree of volume  loss, likely with superimposed mild communicating hydrocephalus, stable. Moderate chronic microvascular disease, stable.\"     Current Diet Level : Regular texture, Thin liquids    Comments regarding toleration: no concerns/complaints reported; patient reports chronic mucous with throat clearing    Respiratory Status:  clear is not r/t thin liquids; limited receptiveness to suggestions for additional assessment   Dysphagia ex: Not applicable  Training in compensatory strategies:  receptive to small sips  Pt response to ex/training: Needs continued reinforcement/education      Eating Assistance: Setup or clean-up assistance      EDUCATION:   Provided education regarding role of SLP, results of assessment, recommendations and general speech pathology plan of care.   Patient Response: Pt requires ongoing learning, Needs reinforcement  Family education: Family not present/unavailable    If patient discharges prior to next visit, this note will serve as discharge.     Timed Code Minutes: 0  Total Treatment Minutes: 26    Electronically signed by:    Elmira Webster MA, CCC-SLP, #[id number]  Speech-Language Pathologist  Portable phone: [phone]   08/14/24  4:18 PM

## 2024-08-14 NOTE — PROGRESS NOTES
Neurology Progress Note    Updates  Feels well.   Discussed w/ niece on the phone.      Medical History:  Past Medical History:   Diagnosis Date    COPD (chronic obstructive pulmonary disease) (HCC)     Hyperlipidemia     Hypertension     Hypothyroid     Neuropathy, peripheral, autonomic, idiopathic     Rhabdomyolysis     S/P insertion of brain-responsive neurostimulation device      History reviewed. No pertinent surgical history.    Scheduled Meds:   docusate sodium  200 mg Oral Daily    aspirin  162 mg Oral Daily    sodium chloride flush  5-40 mL IntraVENous 2 times per day    enoxaparin  30 mg SubCUTAneous BID    carbidopa-levodopa  1 tablet Oral 4x Daily    carvedilol  3.125 mg Oral BID WC    escitalopram  10 mg Oral Daily    hydrALAZINE  50 mg Oral TID    levothyroxine  112 mcg Oral Daily    losartan  75 mg Oral BID    pantoprazole  40 mg Oral QAM AC    tiotropium  1 puff Inhalation Daily RT    Vitamin D  2,000 Units Oral Daily     Medications Prior to Admission:   aspirin 81 MG EC tablet, Take 1 tablet by mouth in the morning.  losartan (COZAAR) 25 MG tablet, Take 3 tablets by mouth in the morning and at bedtime (Patient taking differently: Take 2 tablets by mouth in the morning and at bedtime)  tiotropium (SPIRIVA RESPIMAT) 1.25 MCG/ACT AERS inhaler, Inhale 2 puffs into the lungs daily  acetaminophen (TYLENOL) 500 MG tablet, Take 2 tablets by mouth daily  carbidopa-levodopa (SINEMET)  MG per tablet, Take 1 tablet by mouth 4 times daily  vitamin D (CHOLECALCIFEROL) 50 MCG (2000 UT) CAPS capsule, Take 1 capsule by mouth daily  docusate sodium (COLACE) 100 MG capsule, Take 2 capsules by mouth daily  carvedilol (COREG) 3.125 MG tablet, Take 1 tablet by mouth 2 times daily (with meals)  escitalopram (LEXAPRO) 10 MG tablet, Take 1 tablet by mouth daily  hydrALAZINE (APRESOLINE) 50 MG tablet, Take 1 tablet by mouth 3 times daily  levothyroxine (SYNTHROID) 112 MCG tablet, Take 1 tablet by mouth    orientation to person, place.     Attention intact as able to attend well to the exam     Language fluent in conversation   Comprehension intact; follows simple commands  Cranial nerves:   CN2: visual fields full   CN 3,4,6: extraocular muscles intact  CN7: face symmetric.  Seems to have a just a bit of dysarthria.    CN8: hearing grossly intact  CN12: tongue midline with protrusion  Strength: good strength in all 4 extremities   Sensory: light touch intact in all 4 extremities.   Cerebellar/coordination: no gross ataxia.    Tone: normal in all 4 extremities  Gait: deferred for comfort.      Labs  Glucose 147  Na 136  K 4.5  Cl 102  BUN 25  Cr 1.0  Ca 8.8      HgA1c 5.4    ALT < 5  AST 15    WBC 5.8K  Hg 13.5  Platelets 168    UA + nitrites, trace LE, 4+ bacteria.     Studies  MRI brain w/o 8/13/24, independently reviewed  IMPRESSION:  Acute to subacute infarction in the right basal ganglia.  Mild to moderate ventriculomegaly, disproportionate to the degree of volume loss, likely with superimposed mild communicating hydrocephalus, stable.  Moderate chronic microvascular disease, stable.      CTA head/neck 8/12/24, independently reviewed  IMPRESSION:  Unremarkable CTA of the head and neck.  Anterior cervical fusion C4-7.    TTE 8/13/24    Left Ventricle: Normal left ventricular systolic function with a visually estimated EF of 55 - 60%. Left ventricle size is normal. Normal wall thickness. Normal wall motion. Normal diastolic function    Right Ventricle: Not well visualized. Unable to assess wall thickness.    Aortic Valve: Mild regurgitation.    Mitral Valve: Not well visualized. Mildly thickened leaflets.    Tricuspid Valve: Mild regurgitation. The estimated RVSP is 15 mmHg.    Aorta: Normal sized aortic root and ascending aorta. Ao root diameter is 3.1 cm.    Image quality is adequate.   - LA normal in size.      Impression/Recommendations  Acute R basal ganglia stroke.   Hypertension.   Hyperlipidemia.

## 2024-08-14 NOTE — PROGRESS NOTES
Physical Therapy  Facility/Department: [address] PROGRESSIVE CARE  Physical Therapy Treatment Note    Name: Ebonie Hilton  : 1945  MRN: [id number]  Date of Service: 2024    Discharge Recommendations:  Subacute/Skilled Nursing Facility   PT Equipment Recommendations  Equipment Needed: No      Ebonie Hliton scored a 13/24 on the AM-PAC short mobility form. Current research shows that an AM-PAC score of 17 or less is typically not associated with a discharge to the patient's home setting. Based on the patient's AM-PAC score and their current functional mobility deficits, it is recommended that the patient have 3-5 sessions per week of Physical Therapy at d/c to increase the patient's independence.  Please see assessment section for further patient specific details.    If patient discharges prior to next session this note will serve as a discharge summary.  Please see below for the latest assessment towards goals.      Patient Diagnosis(es): The primary encounter diagnosis was TIA (transient ischemic attack). Diagnoses of Acute UTI and Cerebrovascular accident (CVA), unspecified mechanism (HCC) were also pertinent to this visit.  Past Medical History:  has a past medical history of COPD (chronic obstructive pulmonary disease) (HCC), Hyperlipidemia, Hypertension, Hypothyroid, Neuropathy, peripheral, autonomic, idiopathic, Rhabdomyolysis, and S/P insertion of brain-responsive neurostimulation device.  Past Surgical History:  has no past surgical history on file.    Assessment   Body Structures, Functions, Activity Limitations Requiring Skilled Therapeutic Intervention: Decreased functional mobility   Assessment: pt is a 80 yo female who was adm to hosp from her AL facility for concerns of TIA/CVA due to slurred speech and aphasia. MRI (+) for acute infarct.  Pt at baseline is Ind with a rollator for mobility tasks but has assist for bathing.  Pt has had several falls in past few months and remains an elevated  10'  Comments: pt used commode; assisted with changing adult brief and cleansing pt; nurse in to check BP during session; javad zavaleta used to transfer to chair due to elevated BP and increased difficulty with amb this date; positioned in chair for comfort with LEs elevated and pillows/waffle cushion in place for support and pressure relief; all needs in reach     Balance  Comments: CGA for sitting balance; mod A for standing due to posterior lean         AM-PAC - Mobility    AM-PAC Basic Mobility - Inpatient   How much help is needed turning from your back to your side while in a flat bed without using bedrails?: A Little  How much help is needed moving from lying on your back to sitting on the side of a flat bed without using bedrails?: A Little  How much help is needed moving to and from a bed to a chair?: A Lot  How much help is needed standing up from a chair using your arms?: A Lot  How much help is needed walking in hospital room?: A Lot  How much help is needed climbing 3-5 steps with a railing?: Total  AM-PAC Inpatient Mobility Raw Score : 13  AM-PAC Inpatient T-Scale Score : 36.74  Mobility Inpatient CMS 0-100% Score: 64.91  Mobility Inpatient CMS G-Code Modifier : CL     Goals  Short Term Goals  Time Frame for Short Term Goals: by discharge  Short Term Goal 1: bed mob SBA  Short Term Goal 2: transfers CGA  Short Term Goal 3: amb 100' with RW CGA  Patient Goals   Patient Goals : to go back to her AL apt       Education  Patient Education  Education Given To: Patient  Education Provided Comments: reviewed call light and not getting up without assist  Education Method: Verbal  Education Outcome: Verbalized understanding;Continued education needed      Therapy Time   Individual Concurrent Group Co-treatment   Time In 0750         Time Out 0837         Minutes 47                 MACK ROMAN, PT     Electronically signed by MACK ROMAN PT on 8/14/2024 at 8:38 AM

## 2024-08-14 NOTE — PROGRESS NOTES
Occupational Therapy  Facility/Department: [facility] 5W PROGRESSIVE CARE  Occupational Therapy Daily Assessment    Name: Ebonie Hilton  : 1945  MRN: [id number]  Date of Service: 2024    Discharge Recommendations:  Patient would benefit from continued therapy after discharge, 3-5 sessions per week  OT Equipment Recommendations  Other: defer to [place] facility     Ebonie Hilton scored a 15/24 on the AM-PAC ADL Inpatient form. Current research shows that an AM-PAC score of 17 or less is typically not associated with a discharge to the patient's home setting. Based on the patient's AM-PAC score and their current ADL deficits, it is recommended that the patient have 3-5 sessions per week of Occupational Therapy at d/c to increase the patient's independence.  Please see assessment section for further patient specific details.    If patient discharges prior to next session this note will serve as a discharge summary.  Please see below for the latest assessment towards goals.      Patient Diagnosis(es): The primary encounter diagnosis was TIA (transient ischemic attack). Diagnoses of Acute UTI and Cerebrovascular accident (CVA), unspecified mechanism (HCC) were also pertinent to this visit.  Past Medical History:  has a past medical history of COPD (chronic obstructive pulmonary disease) (HCC), Hyperlipidemia, Hypertension, Hypothyroid, Neuropathy, peripheral, autonomic, idiopathic, Rhabdomyolysis, and S/P insertion of brain-responsive neurostimulation device.  Past Surgical History:  has no past surgical history on file.    Assessment   Performance deficits / Impairments: Decreased functional mobility ;Decreased safe awareness;Decreased balance;Decreased ADL status;Decreased cognition;Decreased endurance;Decreased high-level IADLs  Assessment: 78 y/o female admitted 2024 with CVA r/o. PTA pt from Assisted Living at [facility]. Pt reports staff assist with showers/dressing and pt ind with toileting and uses  History  Social/Functional History  Lives With: Alone  Type of Home: Assisted living ([facility])  Home Layout: Multi-level  Home Access: Elevator, Level entry  Bathroom Shower/Tub: Walk-in shower  Bathroom Toilet: Handicap height  Bathroom Equipment: Grab bars in shower  Home Equipment: Wheelchair - Electric, Walker - 4-Wheeled, Walker - Rolling  Has the patient had two or more falls in the past year or any fall with injury in the past year?: Yes (a couple falls recently)  ADL Assistance:  (Help as needed for bathing/dressing.  (I) toileting)  Ambulation Assistance: Independent (with rollator)  Transfer Assistance: Independent       Objective   Safety Devices  Type of Devices: Call light within reach;Chair alarm in place;Left in chair;Nurse notified;All fall risk precautions in place;Patient at risk for falls;All jean prominences offloaded     Toilet Transfers  Equipment Used: Standard toilet  Toilet Transfer: Minimal assistance  Toilet Transfers Comments: toilet > stedy lift with min A     ADL  LE Dressing: Maximum assistance  LE Dressing Skilled Clinical Factors: assist to sheila depends and pull over hips when standing to STEDY lift  Toileting: Maximum assistance  Toileting Skilled Clinical Factors: assist to change soiled depends and cleanse pt while standing in stedy lift with min A for balance  Functional Mobility: Moderate assistance  Functional Mobility Skilled Clinical Factors: Pt stood to RW with mod A and ambulated bed > bathroom with min A x 2 and walker. Pt requires cuing to sequence steps and to move left foot. Use of stedy lift from bathroom > chair d/t high BP.  Additional Comments: Further ADLs deferred 2/2 high BP. Anticipate pt will require max A for LB bathing/dressing, min A for UB bathing/dressing and grooming when seated based on balance and endurance observed     Activity Tolerance  Activity Tolerance: Patient tolerated treatment well    Bed mobility  Supine to Sit: Minimal assistance

## 2024-08-14 NOTE — PROGRESS NOTES
V2.0    [facility] [place], [place] Progress Note      Name:  Ebonie Hilton /Age/Sex: 1945  (79 y.o. female)   MRN & CSN:  [id number] & [id number] Encounter Date/Time: 2024 8:57 AM EDT   Location:  [id number] PCP: Kenneth Hernandez MD     Attending:Armando Martin MD       Hospital Day: 3      HPI :   Chief Complaint:     Ebonie Hilton is a 79 y.o. female who presents with stroke like symtpoms      Subjective:     Feels ok ansd wants to go home    Review of Systems:      Pertinent positives and negatives discussed in HPI    Objective:     Intake/Output Summary (Last 24 hours) at 2024 1120  Last data filed at 2024 1353  Gross per 24 hour   Intake 240 ml   Output --   Net 240 ml      Vitals:   Vitals:    24 0743 24 0816 24 0841 24 1100   BP: (!) 190/95 (!) 199/78  (!) 141/84   Pulse:  61 61 67   Resp:   17 18   Temp: 97.4 °F (36.3 °C)   97.4 °F (36.3 °C)   TempSrc: Oral   Axillary   SpO2: 94%  95% 96%   Weight:       Height:             Physical Exam:      Physical Exam Performed:    BP (!) 141/84   Pulse 67   Temp 97.4 °F (36.3 °C) (Axillary)   Resp 18   Ht 1.626 m (5' 4\")   Wt 99.5 kg (219 lb 5.7 oz)   SpO2 96%   BMI 37.65 kg/m²     General appearance: No apparent distress, appears stated age and cooperative.  HEENT: Pupils equal, round, and reactive to light. Conjunctivae/corneas clear.  Neck: Supple, with full range of motion. No jugular venous distention. Trachea midline.  Respiratory:  Normal respiratory effort. Clear to auscultation, bilaterally without Rales/Wheezes/Rhonchi.  Cardiovascular: Regular rate and rhythm with normal S1/S2 without murmurs, rubs or gallops.  Abdomen: Soft, non-tender, non-distended with normal bowel sounds.  Musculoskeletal: No clubbing, cyanosis or edema bilaterally.  Full range of motion without deformity.  Neurologic:  Neurovascularly intact without any focal sensory/motor deficits. Cranial nerves: II-XII intact, grossly  non-focal.  Psychiatric: Alert and oriented, thought content appropriate, normal insight  Capillary Refill: Brisk, 3 seconds, normal   Peripheral Pulses: +2 palpable, equal bilaterally       Medications:   Medications:    docusate sodium  200 mg Oral Daily    aspirin  162 mg Oral Daily    sodium chloride flush  5-40 mL IntraVENous 2 times per day    enoxaparin  30 mg SubCUTAneous BID    carbidopa-levodopa  1 tablet Oral 4x Daily    carvedilol  3.125 mg Oral BID WC    escitalopram  10 mg Oral Daily    hydrALAZINE  50 mg Oral TID    levothyroxine  112 mcg Oral Daily    losartan  75 mg Oral BID    pantoprazole  40 mg Oral QAM AC    tiotropium  1 puff Inhalation Daily RT    Vitamin D  2,000 Units Oral Daily      Infusions:    sodium chloride       PRN Meds: acetaminophen, 650 mg, Q4H PRN  polyethylene glycol, 17 g, Daily PRN  sodium chloride flush, 5-40 mL, PRN  sodium chloride, , PRN  ondansetron, 4 mg, Q8H PRN   Or  ondansetron, 4 mg, Q6H PRN          Labs and Imaging   CT ABDOMEN PELVIS W IV CONTRAST Additional Contrast? None    Result Date: 6/8/2023  EXAMINATION: CT OF THE ABDOMEN AND PELVIS WITH CONTRAST 6/8/2023 7:52 pm TECHNIQUE: CT of the abdomen and pelvis was performed with the administration of intravenous contrast. Multiplanar reformatted images are provided for review. Automated exposure control, iterative reconstruction, and/or weight based adjustment of the mA/kV was utilized to reduce the radiation dose to as low as reasonably achievable. COMPARISON: 09/11/2022 HISTORY: ORDERING SYSTEM PROVIDED HISTORY: abd pain, diarrhea TECHNOLOGIST PROVIDED HISTORY: Reason for exam:->abd pain, diarrhea Additional Contrast?->None Decision Support Exception - unselect if not a suspected or confirmed emergency medical condition->Emergency Medical Condition (MA) Reason for Exam: abd pain, diarrhea FINDINGS: Lower Chest: Coarse interstitial findings in the lung bases are again demonstrated compatible with fibrotic lung

## 2024-08-15 VITALS
OXYGEN SATURATION: 98 % | WEIGHT: 220.9 LBS | BODY MASS INDEX: 37.71 KG/M2 | HEIGHT: 64 IN | SYSTOLIC BLOOD PRESSURE: 134 MMHG | HEART RATE: 62 BPM | TEMPERATURE: 97.4 F | RESPIRATION RATE: 19 BRPM | DIASTOLIC BLOOD PRESSURE: 88 MMHG

## 2024-08-15 PROCEDURE — 92523 SPEECH SOUND LANG COMPREHEN: CPT

## 2024-08-15 PROCEDURE — 94640 AIRWAY INHALATION TREATMENT: CPT

## 2024-08-15 PROCEDURE — 92526 ORAL FUNCTION THERAPY: CPT

## 2024-08-15 PROCEDURE — 6360000002 HC RX W HCPCS: Performed by: INTERNAL MEDICINE

## 2024-08-15 PROCEDURE — 2580000003 HC RX 258: Performed by: INTERNAL MEDICINE

## 2024-08-15 PROCEDURE — 6370000000 HC RX 637 (ALT 250 FOR IP): Performed by: INTERNAL MEDICINE

## 2024-08-15 PROCEDURE — 6370000000 HC RX 637 (ALT 250 FOR IP): Performed by: HOSPITALIST

## 2024-08-15 PROCEDURE — 94760 N-INVAS EAR/PLS OXIMETRY 1: CPT

## 2024-08-15 PROCEDURE — 97116 GAIT TRAINING THERAPY: CPT | Performed by: PHYSICAL THERAPIST

## 2024-08-15 PROCEDURE — 97535 SELF CARE MNGMENT TRAINING: CPT

## 2024-08-15 PROCEDURE — 6370000000 HC RX 637 (ALT 250 FOR IP): Performed by: STUDENT IN AN ORGANIZED HEALTH CARE EDUCATION/TRAINING PROGRAM

## 2024-08-15 PROCEDURE — 97530 THERAPEUTIC ACTIVITIES: CPT | Performed by: PHYSICAL THERAPIST

## 2024-08-15 RX ORDER — LOSARTAN POTASSIUM 25 MG/1
75 TABLET ORAL 2 TIMES DAILY
DISCHARGE
Start: 2024-08-15

## 2024-08-15 RX ORDER — FUROSEMIDE 20 MG/1
20 TABLET ORAL DAILY PRN
Qty: 60 TABLET | Refills: 3 | Status: SHIPPED | OUTPATIENT
Start: 2024-08-15

## 2024-08-15 RX ORDER — ASPIRIN 81 MG/1
162 TABLET ORAL DAILY
DISCHARGE
Start: 2024-08-16

## 2024-08-15 RX ADMIN — PANTOPRAZOLE SODIUM 40 MG: 40 TABLET, DELAYED RELEASE ORAL at 05:31

## 2024-08-15 RX ADMIN — CARBIDOPA AND LEVODOPA 1 TABLET: 25; 100 TABLET ORAL at 13:17

## 2024-08-15 RX ADMIN — CARVEDILOL 3.12 MG: 3.12 TABLET, FILM COATED ORAL at 08:59

## 2024-08-15 RX ADMIN — ASPIRIN 162 MG: 81 TABLET, COATED ORAL at 08:58

## 2024-08-15 RX ADMIN — ESCITALOPRAM OXALATE 10 MG: 10 TABLET ORAL at 08:59

## 2024-08-15 RX ADMIN — SODIUM CHLORIDE, PRESERVATIVE FREE 10 ML: 5 INJECTION INTRAVENOUS at 09:00

## 2024-08-15 RX ADMIN — ENOXAPARIN SODIUM 30 MG: 100 INJECTION SUBCUTANEOUS at 08:58

## 2024-08-15 RX ADMIN — TIOTROPIUM BROMIDE INHALATION SPRAY 1 PUFF: 3.12 SPRAY, METERED RESPIRATORY (INHALATION) at 07:56

## 2024-08-15 RX ADMIN — LOSARTAN POTASSIUM 75 MG: 50 TABLET, FILM COATED ORAL at 08:59

## 2024-08-15 RX ADMIN — LEVOTHYROXINE SODIUM 112 MCG: 0.11 TABLET ORAL at 05:31

## 2024-08-15 RX ADMIN — HYDRALAZINE HYDROCHLORIDE 50 MG: 50 TABLET ORAL at 13:17

## 2024-08-15 RX ADMIN — Medication 2000 UNITS: at 08:59

## 2024-08-15 RX ADMIN — HYDRALAZINE HYDROCHLORIDE 50 MG: 50 TABLET ORAL at 08:59

## 2024-08-15 RX ADMIN — CARBIDOPA AND LEVODOPA 1 TABLET: 25; 100 TABLET ORAL at 08:59

## 2024-08-15 RX ADMIN — DOCUSATE SODIUM 200 MG: 100 CAPSULE, LIQUID FILLED ORAL at 08:59

## 2024-08-15 NOTE — DISCHARGE SUMMARY
V2.0  Discharge Summary    Name:  Ebonie Hilton /Age/Sex: 1945 (79 y.o. female)   Admit Date: 2024  Discharge Date: 8/15/24    MRN & CSN:  [id number] & [id number] Encounter Date and Time 8/15/24 11:44 AM EDT    Attending:  Armando Martin MD Discharging Provider: Armanod Martin MD       Hospital Course:     Brief HPI: Ebonie Hilton is a  79 y.o. 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     Patient reports that they told her that she was leaning towards 1 side and her speech was not making any sense but she did not believe at the nursing home people.     It is not known for how long she had the symptoms.     On arrival to ER patient NIH of 0 hemodynamically stable.  Blood work unremarkable.  UA showed 4+ bacteria otherwise unremarkable.  EKG normal sinus rhythm without any acute ST changes.     CT head, CTA head and neck unremarkable.  Patient is admitted for further workup.       Brief Problem Based Course:     She was treated for the following    Acute ischemic stroke  Presented with transient right facial droop,     MRI brain shows Acute to subacute infarction in the right basal ganglia.   Mild to moderate ventriculomegaly, disproportionate to the degree of volume   loss, likely with superimposed mild communicating hydrocephalus, stable.   Moderate chronic microvascular disease, stable.      2D echo normal  Continue antiplatelets and statin  Follow-up with neurology  Discharged to [facility] for rehab     -Peripheral neuropathy  -Bilateral leg edema-improved with lasix- BNP/echo wnl-CHF ruled out-continue as needed Lasix  -HTN- c/w coreg,hydralazine,losartan  -Parkisnons dx-c/w sinemet  -Obesity BMI 38  -COPD without acute exacerbation-c/w bronchodilaters   -Cognitive

## 2024-08-15 NOTE — PROGRESS NOTES
CLINICAL PHARMACY NOTE: MEDS TO BEDS    Retail pharmacy has been notified that the patient is either going to a SNF, ARU, or other inpatient facility.     All prescriptions sent to the retail pharmacy for this patient will be kept on profile unless told otherwise.    08/15/24 11:44 AM

## 2024-08-15 NOTE — PROGRESS NOTES
Patients shawnee Babb who is her POA called this morning for an update and requested MD to call her for an update as well. All questions answered. Patient is awaiting discharge to SNF at [facility].

## 2024-08-15 NOTE — CARE COORDINATION
CASE MANAGEMENT DISCHARGE SUMMARY:    DISCHARGE DATE: 8/15/2024    Discharging to Facility/ Agency   Name: [facility]  Address:  [address] [place] [address]  Phone:  [phone]  Fax:  [phone]     TRANSPORTATION: [facility]             TIME: 4:30 pm    PREFERRED PHARMACY: at facility    INSURANCE PRECERT OBTAINED: N/A; patient has traditional Medicare    HENS/PASAAR COMPLETED: completed 8/15/2024    COMMENTS: Patient, patient's family ([place]), bedside RN, and facility aware of discharge plan and timeline.    Electronically signed by YUSUF EDWARDS RN on 8/15/2024 at 12:22 PM

## 2024-08-15 NOTE — PROGRESS NOTES
Facility/Department: [address] PROGRESSIVE CARE  SLP Speech Language Cognitive Assessment and Dysphagia Treatment    Patient: Ebonie Hilton   : 1945   MRN: [id number]      Evaluation Date: 8/15/2024      Admitting Dx:   TIA (transient ischemic attack) [G45.9]  Acute UTI [N39.0]  Transient ischemia [I99.8]  Cerebrovascular accident (CVA), unspecified mechanism (HCC) [I63.9]   has a past medical history of COPD (chronic obstructive pulmonary disease) (HCC), Hyperlipidemia, Hypertension, Hypothyroid, Neuropathy, peripheral, autonomic, idiopathic, Rhabdomyolysis, and S/P insertion of brain-responsive neurostimulation device.   has no past surgical history on file.  Allergies: medication allergies noted  Prior Speech History: None per EMR   Dysphagia History including instrumental assessment: Pt previously evaluated by SLP in 2022 recommended for regular diet with thin liquids.    Any GI or ENT history: None per EMR  Baseline History/Prior Level of Function:   Living Status: Pt resides in [facility]  Baseline diet: regular/thin    Onset: 2024   Pain: Did not state                      Chart Review  H&P: Per H&P \"Ebonie Hilton is a 79 y.o. female with a pmh of hypertension, hypothyroid, Parkinson's resident of nursing home who presents with Transient ischemia \"  Current Consultations this admit: Neurology    Imaging:  Chest X-ray: 24 \"IMPRESSION: No acute process.\"     Head CT: 24 \"IMPRESSION: No acute intracranial abnormality.\"     Brain MRI: 2024 \"IMPRESSION: Acute to subacute infarction in the right basal ganglia. Mild to moderate ventriculomegaly, disproportionate to the degree of volume  loss, likely with superimposed mild communicating hydrocephalus, stable. Moderate chronic microvascular disease, stable.\"     Reason for referral: SLP evaluation orders received due to CVA protocol   Patient Complaint: Pt denied cognitive-linguistic deficits    Speech Diagnosis  Impressions:  Cognitive-Linguistic Deficits   Orientation: Pt alert and oriented x3 with independent us of visual aide. Upon SLP arrival, pt calling out for 'help' when prompted, pt stated \"I want to know what's going on, why are we in the hallway.\" Pt re-oriented with min verbal cues.   Receptive Language Processing: Receptive language grossly WFL. 100% accuracy with mod/complex y/n questions although noted pt benefited from additional processing time. Following multi-step commands adequately.  Expressive Language Expression: Expressive language grossly WFL. Automatic speech intact. Pt able to participate in conversation, although intermittent anomia in conversation. 100% accuracy with responsive naming and confrontational naming task with high frequency words.  Motor speech/voice: Speech remains 100% intelligible at conversational level. No dysarthria or apraxia noted. Adequate vocal quality and intensity.  Cognitive-linguistic: Pt presents with moderate cognitive linguistic deficits in the area of complex problem solving, attention, executive function, memory, and thought organization. Pt benefited from extra time during ADL problem solving task and thought organization tasks. Required multiple repetition of directions for high level attention tasks including divided and alternating attention. Variable insight into deficits as pt stated during evaluation that \"I did not have a stroke.\" At this time, pt would benefit from initiation of functional cognitive-linguistic training to improve ADL/IADL independence upon d/c.     Dysphagia Therapy Impressions:  Pt agreeable to dysphagia treatment session. Reviewed recommendation for MBSS from previous session. Pt continues to deny overt difficulty with PO.   Clinically, pt presents with WFL oral and questioned pharyngeal dysphagia. Consistent immediate throat clear across all trial of thin liquids via straw, significant increased since initial evaluation. No overt s/s of  aspiration with solid PO intake. Educated pt on parkinson's diagnosis and impact on swallow function, and recommendation for MBSS to identify swallow physiology in order to initiation treatment. Pt verbalized comprehension and stated \"I'll think about it.\" Encouraged pt to participate, MBSS can be completed on an outpatient basis.   Considering the above, recommend continue regular diet with thin liquids. Pt would benefit from MBSS if agreeable to evaluation. If there is medical team concern for MBS benefit, recommend MD discussion with patient re: MBS benefit. MBS can be completed with MD order if patient agreeable.     Recommended Diet and Intervention 8/14/2024:  Diet Solids Recommendation:  Regular texture Liquid Consistency Recommendation:  Thin liquids Recommended form of Meds: Whole in puree/pudding      Compensatory Swallowing Strategies:  Upright as possible with all PO intake , Small bites/sips , Eat/feed slowly, Remain upright 30-45 min     Treatment recommendations: Cognitive-Linguistic tx  Frequency of treatment: 2-5 times/week    SLP treatment Intervention: Cognitive-Linguistic    GOALS:  Short Term Speech/Language/Cognitive Goals: Speech therapy for dysphagia tx 2-5 times/week  Patient will demonstrate improved complex verbal problem solving across graded tasks with MIN cues>New goal  Patient will demonstrate improved executive function for basic daily living with MIN cues>New goal  Patient will demonstrate improved complex attention across graded tasks with MIN cues>New goal  Patient will demonstrate improved thought organization across graded tasks with MIN cues>New goal  Patient will demonstrate improved memory (delayed recall, working) across graded tasks with use of strategies and MIN cues>New goal    Short Term Dysphagia Goals: Speech therapy for dysphagia tx 2-5 times/week  Pt will functionally tolerate ongoing assessment of swallow function with diet to be determined as indicated> Ongoing,

## 2024-08-15 NOTE — CARE COORDINATION
08/15/24 1357   IMM Letter   IMM Letter given to Patient/Family/Significant other/Guardian/POA/by: Education and copy of notice provided to patient at bedside by Yusuf Kim RN CM. All questions answered at this time. Patient verbalizes readiness for discharge.   IMM Letter date given: 08/15/24   IMM Letter time given: 1345     Electronically signed by YUSUF KIM RN on 8/15/2024 at 1:57 PM

## 2024-08-15 NOTE — PROGRESS NOTES
Occupational Therapy  Facility/Department: [facility] 5W PROGRESSIVE CARE  Occupational Therapy Daily Assessment    Name: Ebonie Hilton  : 1945  MRN: [id number]  Date of Service: 8/15/2024    Discharge Recommendations:  Patient would benefit from continued therapy after discharge, 3-5 sessions per week  OT Equipment Recommendations  Other: defer to [place] facility     Ebonie Hilton scored a 15/24 on the AM-PAC ADL Inpatient form. Current research shows that an AM-PAC score of 17 or less is typically not associated with a discharge to the patient's home setting. Based on the patient's AM-PAC score and their current ADL deficits, it is recommended that the patient have 3-5 sessions per week of Occupational Therapy at d/c to increase the patient's independence.  Please see assessment section for further patient specific details.    If patient discharges prior to next session this note will serve as a discharge summary.  Please see below for the latest assessment towards goals.      Patient Diagnosis(es): The primary encounter diagnosis was TIA (transient ischemic attack). Diagnoses of Acute UTI and Cerebrovascular accident (CVA), unspecified mechanism (HCC) were also pertinent to this visit.  Past Medical History:  has a past medical history of COPD (chronic obstructive pulmonary disease) (HCC), Hyperlipidemia, Hypertension, Hypothyroid, Neuropathy, peripheral, autonomic, idiopathic, Rhabdomyolysis, and S/P insertion of brain-responsive neurostimulation device.  Past Surgical History:  has no past surgical history on file.           Assessment   Performance deficits / Impairments: Decreased functional mobility ;Decreased safe awareness;Decreased balance;Decreased ADL status;Decreased cognition;Decreased endurance;Decreased high-level IADLs  Assessment: 80 y/o female admitted 2024 with CVA r/o. PTA pt from Assisted Living at [facility]. Pt reports staff assist with showers/dressing and pt ind with toileting

## 2024-08-15 NOTE — PROGRESS NOTES
Physical Therapy  Facility/Department: [address] PROGRESSIVE CARE  Physical Therapy Treatment Note    Name: Ebonie Hilton  : 1945  MRN: [id number]  Date of Service: 8/15/2024    Discharge Recommendations:  Subacute/Skilled Nursing Facility   PT Equipment Recommendations  Equipment Needed: No      Ebonie Hilton scored a 13/24 on the AM-PAC short mobility form. Current research shows that an AM-PAC score of 17 or less is typically not associated with a discharge to the patient's home setting. Based on the patient's AM-PAC score and their current functional mobility deficits, it is recommended that the patient have 3-5 sessions per week of Physical Therapy at d/c to increase the patient's independence.  Please see assessment section for further patient specific details.    If patient discharges prior to next session this note will serve as a discharge summary.  Please see below for the latest assessment towards goals.      Patient Diagnosis(es): The primary encounter diagnosis was TIA (transient ischemic attack). Diagnoses of Acute UTI and Cerebrovascular accident (CVA), unspecified mechanism (HCC) were also pertinent to this visit.  Past Medical History:  has a past medical history of COPD (chronic obstructive pulmonary disease) (HCC), Hyperlipidemia, Hypertension, Hypothyroid, Neuropathy, peripheral, autonomic, idiopathic, Rhabdomyolysis, and S/P insertion of brain-responsive neurostimulation device.  Past Surgical History:  has no past surgical history on file.    Assessment   Body Structures, Functions, Activity Limitations Requiring Skilled Therapeutic Intervention: Decreased functional mobility   Assessment: pt is a 78 yo female who was adm to hosp from her AL facility for concerns of TIA/CVA due to slurred speech and aphasia. MRI (+) for acute infarct.  Pt at baseline is Ind with a rollator for mobility tasks but has assist for bathing.  Pt has had several falls in past few months and remains an elevated    Social/Functional History  Social/Functional History  Lives With: Alone  Type of Home: Assisted living ([facility])  Home Layout: Multi-level  Home Access: Elevator, Level entry  Bathroom Shower/Tub: Walk-in shower  Bathroom Toilet: Handicap height  Bathroom Equipment: Grab bars in shower  Home Equipment: Wheelchair - Electric, Walker - 4-Wheeled, Walker - Rolling  Has the patient had two or more falls in the past year or any fall with injury in the past year?: Yes (a couple falls recently)  ADL Assistance:  (Help as needed for bathing/dressing.  (I) toileting)  Ambulation Assistance: Independent (with rollator)  Transfer Assistance: Independent  Vision/Hearing  Vision  Vision: Impaired  Vision Exceptions: Wears glasses at all times  Hearing  Hearing: Within functional limits    Cognition   Orientation  Overall Orientation Status: Within Functional Limits  Cognition  Overall Cognitive Status: Exceptions  Arousal/Alertness: Appears intact  Following Commands: Follows one step commands with increased time  Attention Span: Attends with cues to redirect  Memory: Decreased recall of recent events  Safety Judgement: Decreased awareness of need for safety;Decreased awareness of need for assistance  Problem Solving: Impaired  Insights: Decreased awareness of deficits  Initiation: Requires cues for some  Cognition Comment: flat affect, delayed processing     Objective      Bed mobility  Supine to Sit: Minimal assistance (HOB elevated, inc time and effort, cuing to use bedrails)  Sit to Supine:  (pt in chair at end of session)  Transfers  Sit to Stand: Moderate Assistance  Stand to Sit: Minimal Assistance;Moderate Assistance  Ambulation  Surface: Level tile  Device: Rolling Walker  Assistance: Minimal assistance;Moderate assistance  Quality of Gait: pt pushing walker too far in front and leaning on walker; kept advancing walker without moving her feet; needs cues to advance feet especially L; needed assist at times to  maneuver walker around objects  Gait Deviations: Slow Haritha;Decreased step length;Decreased step height  Distance: 10', 3' and 25'  Comments: pt used commode; assisted with changing adult brief and cleansing pt; positioned in chair for comfort with LEs elevated and pillows/waffle cushion in place for support and pressure relief; all needs in reach; set up for breakfast tray     Balance  Comments: SBA for sitting balance; min/mod A for standing due to posterior lean; Pt stood at sink to wash her hands and brush her hair.  Pt leaning posteriorly but unable to self correct             AM-PAC - Mobility    AM-PAC Basic Mobility - Inpatient   How much help is needed turning from your back to your side while in a flat bed without using bedrails?: A Little  How much help is needed moving from lying on your back to sitting on the side of a flat bed without using bedrails?: A Little  How much help is needed moving to and from a bed to a chair?: A Lot  How much help is needed standing up from a chair using your arms?: A Lot  How much help is needed walking in hospital room?: A Lot  How much help is needed climbing 3-5 steps with a railing?: Total  AM-PAC Inpatient Mobility Raw Score : 13  AM-PAC Inpatient T-Scale Score : 36.74  Mobility Inpatient CMS 0-100% Score: 64.91  Mobility Inpatient CMS G-Code Modifier : CL          Goals  Short Term Goals  Time Frame for Short Term Goals: by discharge  Short Term Goal 1: bed mob SBA  Short Term Goal 2: transfers CGA  Short Term Goal 3: amb 100' with RW CGA  Patient Goals   Patient Goals : to go back to her AL apt       Education  Patient Education  Education Given To: Patient  Education Provided Comments: reviewed call light and not getting up without assist  Education Method: Verbal;Demonstration  Education Outcome: Verbalized understanding;Continued education needed      Therapy Time   Individual Concurrent Group Co-treatment   Time In 0915         Time Out 0954         Minutes 39

## 2024-08-15 NOTE — PLAN OF CARE
Problem: Discharge Planning  Goal: Discharge to home or other facility with appropriate resources  8/15/2024 0935 by Haydee Heath, RN  Outcome: Progressing     Problem: Skin/Tissue Integrity  Goal: Absence of new skin breakdown  Description: 1.  Monitor for areas of redness and/or skin breakdown  2.  Assess vascular access sites hourly  3.  Every 4-6 hours minimum:  Change oxygen saturation probe site  4.  Every 4-6 hours:  If on nasal continuous positive airway pressure, respiratory therapy assess nares and determine need for appliance change or resting period.  8/15/2024 0935 by Haydee Heath, RN  Outcome: Progressing     Problem: Safety - Adult  Goal: Free from fall injury  8/15/2024 0935 by Haydee Heath, RN  Outcome: Progressing     Problem: ABCDS Injury Assessment  Goal: Absence of physical injury  8/15/2024 0935 by Haydee Heath, RN  Outcome: Progressing

## 2024-08-15 NOTE — PROGRESS NOTES
Patient discharged at this time with Medical transport. Patient jewelry, glasses and other belongings with patient. Report called to nurse at [facility] and all questions answered. IV and tele discontinued.

## 2024-08-15 NOTE — DISCHARGE INSTR - COC
Continuity of Care Form    Patient Name: Ebonie Hilton   :  1945  MRN:  [id number]    Admit date:  2024  Discharge date:  8/15/24    Code Status Order: Full Code   Advance Directives:   Advance Care Flowsheet Documentation             Admitting Physician:  Ya Crespo MD  PCP: Kenneth Hernandez MD    Discharging Nurse: Horacio Heath RN  Discharging Hospital Unit/Room#: T9M-7509/5107-01  Discharging Unit Phone Number: [phone]    Emergency Contact:   Extended Emergency Contact Information  Primary Emergency Contact: Annita Guzman  Address: [address] [place] [address]  Home Phone: [phone]  Mobile Phone: [phone]  Relation: Neighbor   needed? No  Secondary Emergency Contact: Holley Pop  Address: [address] [place] [address]  Home Phone: [phone]  Mobile Phone: [phone]  Relation: Niece/Nephew    Past Surgical History:  History reviewed. No pertinent surgical history.    Immunization History:   Immunization History   Administered Date(s) Administered    COVID-19, PFIZER PURPLE top, DILUTE for use, (age 12 y+), 30mcg/0.3mL 2021, 2021       Active Problems:  Patient Active Problem List   Diagnosis Code    Slurred speech R47.81    Transient ischemia I99.8       Isolation/Infection:   Isolation            No Isolation          Patient Infection Status       None to display            Nurse Assessment:  Last Vital Signs: BP (!) 164/89   Pulse 62   Temp 97.4 °F (36.3 °C) (Axillary)   Resp 19   Ht 1.626 m (5' 4\")   Wt 100.2 kg (220 lb 14.4 oz)   SpO2 98%   BMI 37.92 kg/m²     Last documented pain score (0-10 scale):    Last Weight:   Wt Readings from Last 1 Encounters:   08/15/24 100.2 kg (220 lb 14.4 oz)     Mental Status:  oriented, alert, and coherent    IV Access:  - None    Nursing Mobility/ADLs:  Walking   Assisted  Transfer  Assisted  Bathing  Assisted  Dressing  Assisted  Toileting  Assisted  Feeding   Independent  Med Admin  Assisted  Med Delivery   whole    Wound Care Documentation and Therapy:        Elimination:  Continence:   Bowel: Yes  Bladder: No  Urinary Catheter: None   Colostomy/Ileostomy/Ileal Conduit: No       Date of Last BM: 8/14/24    Intake/Output Summary (Last 24 hours) at 8/15/2024 1142  Last data filed at 8/15/2024 0559  Gross per 24 hour   Intake 240 ml   Output --   Net 240 ml     I/O last 3 completed shifts:  In: 240 [P.O.:240]  Out: -     Safety Concerns:     At Risk for Falls    Impairments/Disabilities:      None    Nutrition Therapy:  Current Nutrition Therapy:   - Oral Diet:  Low Sodium (2gm)    Routes of Feeding: Oral  Liquids: No Restrictions  Daily Fluid Restriction: no  Last Modified Barium Swallow with Video (Video Swallowing Test): not done    Treatments at the Time of Hospital Discharge:   Respiratory Treatments: none  Oxygen Therapy:  is not on home oxygen therapy.  Ventilator:    - No ventilator support    Rehab Therapies: Physical Therapy and Occupational Therapy  Weight Bearing Status/Restrictions: No weight bearing restrictions  Other Medical Equipment (for information only, NOT a DME order):  walker, Stedy  Other Treatments: n/a    Patient's personal belongings (please select all that are sent with patient):  None    RN SIGNATURE:  Electronically signed by Haydee Heath RN on 8/15/24 at 4:00 PM EDT    CASE MANAGEMENT/SOCIAL WORK SECTION    Inpatient Status Date: 8/12/2024    Readmission Risk Assessment Score:  Readmission Risk              Risk of Unplanned Readmission:  12           Discharging to Facility/ Agency   Name: [facility]  Address:  [address] [place] [address]  Phone:  [phone]  Fax:  [phone]     / signature: Electronically signed by YUSUF EDWARDS RN on 8/15/24 at 12:33 PM EDT    PHYSICIAN SECTION    Prognosis: Fair    Condition at Discharge: Stable    Rehab Potential (if transferring to Rehab):  Fair    Recommended Labs or Other Treatments After Discharge:     Physician Certification: I certify the above information and transfer of Ebonie Hilton  is necessary for the continuing treatment of the diagnosis listed and that she requires Skilled Nursing Facility for less 30 days.     Update Admission H&P: No change in H&P    PHYSICIAN SIGNATURE:  Electronically signed by Armando Martin MD on 8/15/24 at 11:43 AM EDT

## 2024-09-05 NOTE — PROGRESS NOTES
Physician Progress Note      PATIENT:               ELICEO MCKAY  [facility] #:                  [id number]  :                       1945  ADMIT DATE:       2024 3:45 PM  DISCH DATE:        8/15/2024 4:35 PM  RESPONDING  PROVIDER #:        Armando Martin MD          QUERY TEXT:    Patient with hypertension was noted to have troponin values of 32, 20. Given   the clinical context, can the elevated troponins be more specifically defined   as:    The medical record reflects the following:  Risk Factors: HTN, acute diastolic heart failure, obesity, COPD  Clinical Indicators: EKG normal sinus rhythm without any acute ST changes.  Treatment: asa, Coreg, Apresoline, Losartan, EKG    Peg Whitaker, DELFINON, RN, CRCR  Clinical   Options provided:  -- Non-ischemic chronic myocardial injury due to hypertension  -- Clinically insignificant troponin elevation  -- Other - I will add my own diagnosis  -- Disagree - Not applicable / Not valid  -- Disagree - Clinically unable to determine / Unknown  -- Refer to Clinical Documentation Reviewer    PROVIDER RESPONSE TEXT:    Clinically insignificant troponin elevation    Query created by: Peg Whitaker on 2024 11:24 AM      Electronically signed by:  Armando Martin MD 2024 10:56 AM